# Patient Record
Sex: FEMALE | Race: WHITE
[De-identification: names, ages, dates, MRNs, and addresses within clinical notes are randomized per-mention and may not be internally consistent; named-entity substitution may affect disease eponyms.]

---

## 2017-01-05 ENCOUNTER — HOSPITAL ENCOUNTER (OUTPATIENT)
Dept: HOSPITAL 17 - CSDC | Age: 72
Discharge: HOME | End: 2017-01-05
Attending: OPTOMETRIST
Payer: COMMERCIAL

## 2017-01-05 VITALS — HEART RATE: 89 BPM

## 2017-01-05 VITALS
SYSTOLIC BLOOD PRESSURE: 131 MMHG | DIASTOLIC BLOOD PRESSURE: 65 MMHG | TEMPERATURE: 98 F | RESPIRATION RATE: 18 BRPM | HEART RATE: 100 BPM | OXYGEN SATURATION: 98 %

## 2017-01-05 VITALS — HEIGHT: 61 IN | WEIGHT: 139.99 LBS | BODY MASS INDEX: 26.43 KG/M2

## 2017-01-05 VITALS
RESPIRATION RATE: 16 BRPM | DIASTOLIC BLOOD PRESSURE: 77 MMHG | HEART RATE: 100 BPM | SYSTOLIC BLOOD PRESSURE: 139 MMHG | TEMPERATURE: 98.1 F | OXYGEN SATURATION: 99 %

## 2017-01-05 VITALS — HEART RATE: 100 BPM

## 2017-01-05 DIAGNOSIS — H25.812: Primary | ICD-10-CM

## 2017-01-05 PROCEDURE — V2632 POST CHMBR INTRAOCULAR LENS: HCPCS

## 2017-01-05 PROCEDURE — 00142 ANES PX ON EYE LENS SURGERY: CPT

## 2017-01-05 PROCEDURE — 66984 XCAPSL CTRC RMVL W/O ECP: CPT

## 2017-01-05 RX ADMIN — CYCLOPENTOLATE HYDROCHLORIDE SCH DROP: 10 SOLUTION/ DROPS OPHTHALMIC at 08:25

## 2017-01-05 RX ADMIN — PROPOFOL ONE MG: 10 INJECTION, EMULSION INTRAVENOUS at 08:57

## 2017-01-05 RX ADMIN — CYCLOPENTOLATE HYDROCHLORIDE SCH DROP: 10 SOLUTION/ DROPS OPHTHALMIC at 08:20

## 2017-01-05 RX ADMIN — TROPICAMIDE SCH DROP: 10 SOLUTION/ DROPS OPHTHALMIC at 08:10

## 2017-01-05 RX ADMIN — FLURBIPROFEN SODIUM SCH DROP: 0.3 SOLUTION/ DROPS OPHTHALMIC at 08:15

## 2017-01-05 RX ADMIN — CYCLOPENTOLATE HYDROCHLORIDE SCH DROP: 10 SOLUTION/ DROPS OPHTHALMIC at 08:15

## 2017-01-05 RX ADMIN — PHENYLEPHRINE HYDROCHLORIDE SCH DROP: 100 SOLUTION/ DROPS OPHTHALMIC at 08:15

## 2017-01-05 RX ADMIN — CYCLOPENTOLATE HYDROCHLORIDE SCH DROP: 10 SOLUTION/ DROPS OPHTHALMIC at 08:10

## 2017-01-05 RX ADMIN — FLURBIPROFEN SODIUM SCH DROP: 0.3 SOLUTION/ DROPS OPHTHALMIC at 08:10

## 2017-01-05 RX ADMIN — MIDAZOLAM HYDROCHLORIDE ONE MG: 1 INJECTION, SOLUTION INTRAMUSCULAR; INTRAVENOUS at 09:11

## 2017-01-05 RX ADMIN — FLURBIPROFEN SODIUM SCH DROP: 0.3 SOLUTION/ DROPS OPHTHALMIC at 08:20

## 2017-01-05 RX ADMIN — PROPOFOL ONE MG: 10 INJECTION, EMULSION INTRAVENOUS at 10:04

## 2017-01-05 RX ADMIN — TROPICAMIDE SCH DROP: 10 SOLUTION/ DROPS OPHTHALMIC at 08:25

## 2017-01-05 RX ADMIN — PHENYLEPHRINE HYDROCHLORIDE ONE MLS/HR: 10 INJECTION INTRAVENOUS at 10:00

## 2017-01-05 RX ADMIN — TROPICAMIDE SCH DROP: 10 SOLUTION/ DROPS OPHTHALMIC at 08:20

## 2017-01-05 RX ADMIN — PHENYLEPHRINE HYDROCHLORIDE SCH DROP: 100 SOLUTION/ DROPS OPHTHALMIC at 08:20

## 2017-01-05 RX ADMIN — PHENYLEPHRINE HYDROCHLORIDE SCH DROP: 100 SOLUTION/ DROPS OPHTHALMIC at 08:25

## 2017-01-05 RX ADMIN — MIDAZOLAM HYDROCHLORIDE ONE MG: 1 INJECTION, SOLUTION INTRAMUSCULAR; INTRAVENOUS at 10:00

## 2017-01-05 RX ADMIN — PHENYLEPHRINE HYDROCHLORIDE SCH DROP: 100 SOLUTION/ DROPS OPHTHALMIC at 08:10

## 2017-01-05 RX ADMIN — PHENYLEPHRINE HYDROCHLORIDE ONE MLS/HR: 10 INJECTION INTRAVENOUS at 08:49

## 2017-01-05 RX ADMIN — FLURBIPROFEN SODIUM SCH DROP: 0.3 SOLUTION/ DROPS OPHTHALMIC at 08:25

## 2017-01-05 RX ADMIN — TROPICAMIDE SCH DROP: 10 SOLUTION/ DROPS OPHTHALMIC at 08:15

## 2017-01-05 NOTE — MP
cc:

KRANTHI GONZALEZ M.D.

****

 

 

[facility] #[id number]

 

DATE OF SURGERY

1/5/2017

 

PREOPERATIVE DIAGNOSIS

Visually significant cataract left eye.

 

POSTOPERATIVE DIAGNOSIS

Visually significant cataract left eye.

 

OPERATION

Phacoemulsification with posterior chamber lens

implantation, left eye.

 

SURGEON

Kranthi Gonzalez MD

 

ANESTHESIA

Retrobulbar with MAC.

 

COMPLICATIONS

None

 

PROCEDURE

After informed consent was obtained, the patient was brought into the operative

suite and placed on appropriate monitors by the Anesthesia Service.  The

patient had received a prior retrobulbar injection of local anesthetic by the

Anesthesia Service in the holding area.  The patient's operative eye was then

prepped and draped in the usual sterile fashion.  A wire lid speculum was

placed.

 

A paracentesis incision was made in the peripheral cornea with a 1 mm conor

keratome.  The anterior chamber was filled with viscoelastic.  The anterior

chamber was then entered through a stepped, clear corneal incision using a

sharp 3 mm conor keratome.  A circular tear capsulorrhexis was then made with

a bent needle cystitome.  Following hydrodissection of the lens nucleus with

balanced saline, phacoemulsification of the nucleus was performed using a

modified chopping technique.  The remaining cortex was removed with

irrigation/aspiration.  The prior two

 

 

procedures were both performed using the handpieces of the [employer]

phaco unit.

 

The capsular bag was then filled with viscoelastic.  The intraocular lens was

then injected into the capsular bag and positioned.  The type of intraocular

lens and its power can be found elsewhere in this chart.  The remaining

viscoelastic was then removed from the anterior chamber with the IA handpiece.

 

 

The anterior chamber was reformed with balanced saline.  The wound was then

closed securely with stromal hydration.  It was found to be watertight to an

intraocular pressure of at least 30 mmHg by palpation.   A small amount of

balanced salt solution was then removed through the paracentesis site and the

intraocular pressure at the end of the case was approximately 20 by palpation.

 

 

All drapes were then removed.  TobraDex ointment was then placed in the eye,

which was closed beneath a semi-pressure patch dressing.

 

The patient tolerated this procedure well and left the operating room awake and

alert.  The patient is to follow-up in my office in the morning.

 

 

 

                              _________________________________

                              MD YRN Espinal/TANNER

D:  1/5/2017/10:05 AM

T:  1/5/2017/10:09 AM

Visit #:  [id number]

Job #:  [id number]

## 2017-02-16 ENCOUNTER — HOSPITAL ENCOUNTER (OUTPATIENT)
Dept: HOSPITAL 17 - CSDC | Age: 72
Discharge: HOME | End: 2017-02-16
Attending: OPTOMETRIST
Payer: MEDICARE

## 2017-02-16 VITALS
OXYGEN SATURATION: 99 % | RESPIRATION RATE: 16 BRPM | SYSTOLIC BLOOD PRESSURE: 90 MMHG | HEART RATE: 98 BPM | DIASTOLIC BLOOD PRESSURE: 62 MMHG

## 2017-02-16 VITALS
OXYGEN SATURATION: 95 % | TEMPERATURE: 98.1 F | RESPIRATION RATE: 16 BRPM | SYSTOLIC BLOOD PRESSURE: 132 MMHG | DIASTOLIC BLOOD PRESSURE: 78 MMHG | HEART RATE: 101 BPM

## 2017-02-16 VITALS — HEIGHT: 61.5 IN | BODY MASS INDEX: 27.12 KG/M2 | WEIGHT: 145.51 LBS

## 2017-02-16 VITALS — TEMPERATURE: 98.2 F

## 2017-02-16 VITALS — HEART RATE: 95 BPM

## 2017-02-16 DIAGNOSIS — H25.811: Primary | ICD-10-CM

## 2017-02-16 PROCEDURE — 00142 ANES PX ON EYE LENS SURGERY: CPT

## 2017-02-16 PROCEDURE — V2632 POST CHMBR INTRAOCULAR LENS: HCPCS

## 2017-02-16 PROCEDURE — 66984 XCAPSL CTRC RMVL W/O ECP: CPT

## 2017-02-16 RX ADMIN — TOBRAMYCIN AND DEXAMETHASONE ONE APPLIC: 3; 1 OINTMENT OPHTHALMIC at 09:06

## 2017-02-16 RX ADMIN — TOBRAMYCIN AND DEXAMETHASONE ONE APPLIC: 3; 1 OINTMENT OPHTHALMIC at 09:15

## 2017-02-17 NOTE — MP
cc:

KRANTHI GONZALEZ M.D.

****

 

 

[facility] #[id number]

 

DATE OF SURGERY

2/16/2017

 

PREOPERATIVE DIAGNOSIS

Visually significant cataract right eye.

 

POSTOPERATIVE DIAGNOSIS

Visually significant cataract right eye.

 

OPERATION

Phacoemulsification with posterior chamber lens

implantation, right eye.

 

SURGEON

Kranthi Gonzalez MD

 

ANESTHESIA

Retrobulbar with MAC.

 

COMPLICATIONS

None

 

PROCEDURE

After informed consent was obtained, the patient was brought into the operative

suite and placed on appropriate monitors by the Anesthesia Service.  The

patient had received a prior retrobulbar injection of local anesthetic by the

Anesthesia Service in the holding area.  The patient's operative eye was then

prepped and draped in the usual sterile fashion.  A wire lid speculum was

placed.

 

A paracentesis incision was made in the peripheral cornea with a 1 mm conor

keratome.  The anterior chamber was filled with viscoelastic.  The anterior

chamber was then entered through a stepped, clear corneal incision using a

sharp 3 mm conor keratome.  A circular tear capsulorrhexis was then made with

a bent needle cystitome.  Following hydrodissection of the lens nucleus with

balanced saline, phacoemulsification of the nucleus was performed using a

modified chopping technique.  The remaining cortex was removed with

irrigation/aspiration.  The prior two procedures were both performed using the

handpieces of the [employer] phaco unit.

 

The capsular bag was then filled with viscoelastic.  The intraocular lens was

then injected into the capsular bag and positioned.  The type of intraocular

lens and its power can be found elsewhere in this chart.  The remaining

viscoelastic was then removed from the anterior chamber with the IA handpiece.

 

 

The anterior chamber was reformed with balanced saline.  The wound was then

closed securely with stromal hydration.  It was found to be watertight to an

intraocular pressure of at least 30 mmHg by palpation.   A small amount of

balanced salt solution was then removed through the paracentesis site and the

intraocular pressure at the end of the case was approximately 20 by palpation.

 

 

All drapes were then removed.  TobraDex ointment was then placed in the eye,

which was closed beneath a semi-pressure patch dressing.

 

The patient tolerated this procedure well and left the operating room awake and

alert.  The patient is to follow-up in my office in the morning.

 

 

 

                              _________________________________

                              MD YRN Espinal/TANNER

D:  2/16/2017/10:15 AM

T:  2/17/2017/9:21 AM

Visit #:  [id number]

Job #:  [id number]

## 2018-01-09 ENCOUNTER — HOSPITAL ENCOUNTER (INPATIENT)
Dept: HOSPITAL 17 - NEPE | Age: 73
LOS: 10 days | Discharge: HOSPICE | DRG: 291 | End: 2018-01-19
Attending: FAMILY MEDICINE | Admitting: HOSPITALIST
Payer: MEDICARE

## 2018-01-09 VITALS
OXYGEN SATURATION: 95 % | TEMPERATURE: 97.4 F | HEART RATE: 88 BPM | RESPIRATION RATE: 20 BRPM | DIASTOLIC BLOOD PRESSURE: 58 MMHG | SYSTOLIC BLOOD PRESSURE: 91 MMHG

## 2018-01-09 VITALS — RESPIRATION RATE: 18 BRPM | SYSTOLIC BLOOD PRESSURE: 85 MMHG | DIASTOLIC BLOOD PRESSURE: 58 MMHG | HEART RATE: 100 BPM

## 2018-01-09 VITALS — RESPIRATION RATE: 16 BRPM | DIASTOLIC BLOOD PRESSURE: 66 MMHG | SYSTOLIC BLOOD PRESSURE: 93 MMHG | HEART RATE: 92 BPM

## 2018-01-09 VITALS — WEIGHT: 131.18 LBS | HEIGHT: 61 IN | BODY MASS INDEX: 24.77 KG/M2

## 2018-01-09 VITALS — OXYGEN SATURATION: 100 %

## 2018-01-09 DIAGNOSIS — R00.0: ICD-10-CM

## 2018-01-09 DIAGNOSIS — J96.01: ICD-10-CM

## 2018-01-09 DIAGNOSIS — D69.6: ICD-10-CM

## 2018-01-09 DIAGNOSIS — I42.8: ICD-10-CM

## 2018-01-09 DIAGNOSIS — K21.9: ICD-10-CM

## 2018-01-09 DIAGNOSIS — E86.0: ICD-10-CM

## 2018-01-09 DIAGNOSIS — G62.9: ICD-10-CM

## 2018-01-09 DIAGNOSIS — I50.43: Primary | ICD-10-CM

## 2018-01-09 DIAGNOSIS — K52.9: ICD-10-CM

## 2018-01-09 DIAGNOSIS — R57.0: ICD-10-CM

## 2018-01-09 DIAGNOSIS — I50.84: ICD-10-CM

## 2018-01-09 DIAGNOSIS — G93.40: ICD-10-CM

## 2018-01-09 DIAGNOSIS — I11.0: ICD-10-CM

## 2018-01-09 DIAGNOSIS — I27.81: ICD-10-CM

## 2018-01-09 DIAGNOSIS — J18.9: ICD-10-CM

## 2018-01-09 DIAGNOSIS — Z88.1: ICD-10-CM

## 2018-01-09 DIAGNOSIS — I27.29: ICD-10-CM

## 2018-01-09 DIAGNOSIS — R60.0: ICD-10-CM

## 2018-01-09 DIAGNOSIS — E78.5: ICD-10-CM

## 2018-01-09 DIAGNOSIS — I95.9: ICD-10-CM

## 2018-01-09 DIAGNOSIS — E87.2: ICD-10-CM

## 2018-01-09 DIAGNOSIS — N17.0: ICD-10-CM

## 2018-01-09 DIAGNOSIS — Z51.5: ICD-10-CM

## 2018-01-09 DIAGNOSIS — K76.1: ICD-10-CM

## 2018-01-09 DIAGNOSIS — R53.1: ICD-10-CM

## 2018-01-09 DIAGNOSIS — A41.9: ICD-10-CM

## 2018-01-09 DIAGNOSIS — Z88.5: ICD-10-CM

## 2018-01-09 DIAGNOSIS — E83.42: ICD-10-CM

## 2018-01-09 DIAGNOSIS — Z66: ICD-10-CM

## 2018-01-09 DIAGNOSIS — R10.13: ICD-10-CM

## 2018-01-09 DIAGNOSIS — Z88.0: ICD-10-CM

## 2018-01-09 DIAGNOSIS — D45: ICD-10-CM

## 2018-01-09 DIAGNOSIS — J90: ICD-10-CM

## 2018-01-09 DIAGNOSIS — I50.82: ICD-10-CM

## 2018-01-09 DIAGNOSIS — E16.2: ICD-10-CM

## 2018-01-09 LAB
ALBUMIN SERPL-MCNC: 2.8 GM/DL (ref 3.4–5)
ALP SERPL-CCNC: 74 U/L (ref 45–117)
ALT SERPL-CCNC: 17 U/L (ref 10–53)
AST SERPL-CCNC: 39 U/L (ref 15–37)
BASOPHILS # BLD AUTO: 0 TH/MM3 (ref 0–0.2)
BASOPHILS NFR BLD: 0.3 % (ref 0–2)
BILIRUB SERPL-MCNC: 0.8 MG/DL (ref 0.2–1)
BUN SERPL-MCNC: 30 MG/DL (ref 7–18)
CALCIUM SERPL-MCNC: 8.2 MG/DL (ref 8.5–10.1)
CHLORIDE SERPL-SCNC: 100 MEQ/L (ref 98–107)
CREAT SERPL-MCNC: 1.4 MG/DL (ref 0.5–1)
EOSINOPHIL # BLD: 0 TH/MM3 (ref 0–0.4)
EOSINOPHIL NFR BLD: 0 % (ref 0–4)
ERYTHROCYTE [DISTWIDTH] IN BLOOD BY AUTOMATED COUNT: 17.6 % (ref 11.6–17.2)
GFR SERPLBLD BASED ON 1.73 SQ M-ARVRAT: 37 ML/MIN (ref 89–?)
GLUCOSE SERPL-MCNC: 84 MG/DL (ref 74–106)
HCO3 BLD-SCNC: 17.5 MEQ/L (ref 21–32)
HCT VFR BLD CALC: 40.4 % (ref 35–46)
HGB BLD-MCNC: 13.4 GM/DL (ref 11.6–15.3)
INR PPP: 1.5 RATIO
LACTIC ACID SEPSIS PROTOCOL: 4 MMOL/L (ref 0.4–2)
LIPASE: 145 U/L (ref 73–393)
LYMPHOCYTES # BLD AUTO: 1.1 TH/MM3 (ref 1–4.8)
LYMPHOCYTES NFR BLD AUTO: 15.6 % (ref 9–44)
MAGNESIUM SERPL-MCNC: 1.2 MG/DL (ref 1.5–2.5)
MCH RBC QN AUTO: 30 PG (ref 27–34)
MCHC RBC AUTO-ENTMCNC: 33.2 % (ref 32–36)
MCV RBC AUTO: 90.3 FL (ref 80–100)
MONOCYTE #: 0.3 TH/MM3 (ref 0–0.9)
MONOCYTES NFR BLD: 3.9 % (ref 0–8)
NEUTROPHILS # BLD AUTO: 5.7 TH/MM3 (ref 1.8–7.7)
NEUTROPHILS NFR BLD AUTO: 80.2 % (ref 16–70)
PLATELET # BLD: 143 TH/MM3 (ref 150–450)
PMV BLD AUTO: 8.6 FL (ref 7–11)
PROT SERPL-MCNC: 6.7 GM/DL (ref 6.4–8.2)
PROTHROMBIN TIME: 15.6 SEC (ref 9.8–11.6)
RBC # BLD AUTO: 4.48 MIL/MM3 (ref 4–5.3)
SODIUM SERPL-SCNC: 134 MEQ/L (ref 136–145)
TROPONIN I SERPL-MCNC: 0.16 NG/ML (ref 0.02–0.05)
WBC # BLD AUTO: 7.1 TH/MM3 (ref 4–11)

## 2018-01-09 PROCEDURE — 84481 FREE ASSAY (FT-3): CPT

## 2018-01-09 PROCEDURE — 82805 BLOOD GASES W/O2 SATURATION: CPT

## 2018-01-09 PROCEDURE — 85610 PROTHROMBIN TIME: CPT

## 2018-01-09 PROCEDURE — 83690 ASSAY OF LIPASE: CPT

## 2018-01-09 PROCEDURE — 87116 MYCOBACTERIA CULTURE: CPT

## 2018-01-09 PROCEDURE — 83986 ASSAY PH BODY FLUID NOS: CPT

## 2018-01-09 PROCEDURE — 85007 BL SMEAR W/DIFF WBC COUNT: CPT

## 2018-01-09 PROCEDURE — 82945 GLUCOSE OTHER FLUID: CPT

## 2018-01-09 PROCEDURE — 87640 STAPH A DNA AMP PROBE: CPT

## 2018-01-09 PROCEDURE — 87040 BLOOD CULTURE FOR BACTERIA: CPT

## 2018-01-09 PROCEDURE — 87205 SMEAR GRAM STAIN: CPT

## 2018-01-09 PROCEDURE — 87015 SPECIMEN INFECT AGNT CONCNTJ: CPT

## 2018-01-09 PROCEDURE — 93306 TTE W/DOPPLER COMPLETE: CPT

## 2018-01-09 PROCEDURE — 82150 ASSAY OF AMYLASE: CPT

## 2018-01-09 PROCEDURE — C1769 GUIDE WIRE: HCPCS

## 2018-01-09 PROCEDURE — 36600 WITHDRAWAL OF ARTERIAL BLOOD: CPT

## 2018-01-09 PROCEDURE — 84157 ASSAY OF PROTEIN OTHER: CPT

## 2018-01-09 PROCEDURE — 36430 TRANSFUSION BLD/BLD COMPNT: CPT

## 2018-01-09 PROCEDURE — 82550 ASSAY OF CK (CPK): CPT

## 2018-01-09 PROCEDURE — 71045 X-RAY EXAM CHEST 1 VIEW: CPT

## 2018-01-09 PROCEDURE — 93005 ELECTROCARDIOGRAM TRACING: CPT

## 2018-01-09 PROCEDURE — 83735 ASSAY OF MAGNESIUM: CPT

## 2018-01-09 PROCEDURE — 96374 THER/PROPH/DIAG INJ IV PUSH: CPT

## 2018-01-09 PROCEDURE — 83605 ASSAY OF LACTIC ACID: CPT

## 2018-01-09 PROCEDURE — 81001 URINALYSIS AUTO W/SCOPE: CPT

## 2018-01-09 PROCEDURE — 80053 COMPREHEN METABOLIC PANEL: CPT

## 2018-01-09 PROCEDURE — 84443 ASSAY THYROID STIM HORMONE: CPT

## 2018-01-09 PROCEDURE — 84100 ASSAY OF PHOSPHORUS: CPT

## 2018-01-09 PROCEDURE — 87102 FUNGUS ISOLATION CULTURE: CPT

## 2018-01-09 PROCEDURE — 87493 C DIFF AMPLIFIED PROBE: CPT

## 2018-01-09 PROCEDURE — 86038 ANTINUCLEAR ANTIBODIES: CPT

## 2018-01-09 PROCEDURE — 74177 CT ABD & PELVIS W/CONTRAST: CPT

## 2018-01-09 PROCEDURE — 85730 THROMBOPLASTIN TIME PARTIAL: CPT

## 2018-01-09 PROCEDURE — 94003 VENT MGMT INPAT SUBQ DAY: CPT

## 2018-01-09 PROCEDURE — C9113 INJ PANTOPRAZOLE SODIUM, VIA: HCPCS

## 2018-01-09 PROCEDURE — 84484 ASSAY OF TROPONIN QUANT: CPT

## 2018-01-09 PROCEDURE — P9035 PLATELET PHERES LEUKOREDUCED: HCPCS

## 2018-01-09 PROCEDURE — 83880 ASSAY OF NATRIURETIC PEPTIDE: CPT

## 2018-01-09 PROCEDURE — 94002 VENT MGMT INPAT INIT DAY: CPT

## 2018-01-09 PROCEDURE — 85025 COMPLETE CBC W/AUTO DIFF WBC: CPT

## 2018-01-09 PROCEDURE — 88112 CYTOPATH CELL ENHANCE TECH: CPT

## 2018-01-09 PROCEDURE — 87070 CULTURE OTHR SPECIMN AEROBIC: CPT

## 2018-01-09 PROCEDURE — P9047 ALBUMIN (HUMAN), 25%, 50ML: HCPCS

## 2018-01-09 PROCEDURE — 86140 C-REACTIVE PROTEIN: CPT

## 2018-01-09 PROCEDURE — 87641 MR-STAPH DNA AMP PROBE: CPT

## 2018-01-09 PROCEDURE — 84132 ASSAY OF SERUM POTASSIUM: CPT

## 2018-01-09 PROCEDURE — 86022 PLATELET ANTIBODIES: CPT

## 2018-01-09 PROCEDURE — 83615 LACTATE (LD) (LDH) ENZYME: CPT

## 2018-01-09 PROCEDURE — 82948 REAGENT STRIP/BLOOD GLUCOSE: CPT

## 2018-01-09 PROCEDURE — 89051 BODY FLUID CELL COUNT: CPT

## 2018-01-09 PROCEDURE — 84439 ASSAY OF FREE THYROXINE: CPT

## 2018-01-09 PROCEDURE — 85027 COMPLETE CBC AUTOMATED: CPT

## 2018-01-09 PROCEDURE — 87206 SMEAR FLUORESCENT/ACID STAI: CPT

## 2018-01-09 PROCEDURE — 80162 ASSAY OF DIGOXIN TOTAL: CPT

## 2018-01-09 PROCEDURE — 80048 BASIC METABOLIC PNL TOTAL CA: CPT

## 2018-01-09 NOTE — RADRPT
EXAM DATE/TIME:  01/09/2018 19:39 

 

[facility] COMPARISON:     

No previous studies available for comparison.

 

                     

INDICATIONS :     

Short of breath. 

                     

 

MEDICAL HISTORY :     

Congestive heart failure.          

 

SURGICAL HISTORY :     

None.   

 

ENCOUNTER:     

Initial                                        

 

ACUITY:     

1 day      

 

PAIN SCORE:     

1/10

 

LOCATION:     

Bilateral chest 

 

FINDINGS:     

There are moderate right and small left pleural effusions with basilar consolidation noted. No pneumo
thorax. Mild cardiomegaly noted.

 

CONCLUSION:     

Right greater than left pleural effusions and basilar consolidation.

 

 

 

 Demian Alegria MD on January 09, 2018 at 19:44           

Board Certified Radiologist.

 This report was verified electronically.

## 2018-01-09 NOTE — PD
HPI


Chief Complaint:  GI Complaint


Time Seen by Provider:  18:49


Travel History


International Travel<30 days:  No


Contact w/Intl Traveler<30days:  No


Traveled to known affect area:  No





History of Present Illness


HPI


72-year-old female presents to the emergency department via EMS for evaluation 

of generalized weakness, nausea.  Patient states she was diagnosed with 

congestive heart failure in October.  She states she was admitted to [facility] on December 23 and released on January 1.  She states that 

today, she has been so weak that she has been unable to get herself out of the 

bed to her walker.  She reports history of diarrhea for several months, but her 

primary care physician gave her Lomotil which helps.  She states that she had 

one episode of diarrhea today.  She reports nausea.  She also reports decreased 

appetite.  She states she has chronic shortness of breath.  She denies any 

chest pain.  Upon palpation, she is epigastric tenderness to palpation of her 

abdomen.  Patient has 3+ pitting edema to her bilateral lower extremities.  She 

states it has been like this since she was discharged on June refer [facility].  No exacerbating or alleviating factors.  Moderate severity.





PFSH


Past Medical History


Arthritis:  Yes


Asthma:  No


Autoimmune Disease:  No


Blood Disorders:  No


Cancer:  No


Cardiovascular Problems:  No


Chemotherapy:  No


COPD:  No


Diabetes:  No


Endocrine:  No


GERD:  Yes


Genitourinary:  No


Hepatitis:  No


Hiatal Hernia:  Yes


Immune Disorder:  No


Musculoskeletal:  Yes (ARTHRITIS LOWER BACK, HANDS, FEET)


Neurologic:  No


Psychiatric:  No


Reproductive:  No


Respiratory:  Yes (ENVIRONMENTAL ALLERGIES, BRONCHITIS)


Radiation Therapy:  No


Thyroid Disease:  No


Ulcer:  No





Past Surgical History


Abdominal Surgery:  Yes (GARCIA.,INCISIONAL HERNIA REPAIR)


AICD:  No


Cardiac Surgery:  No


Ear Surgery:  No


Endocrine Surgery:  Yes


Eye Surgery:  Yes (LEFT CATARACT SURGERY)


Genitourinary Surgery:  Yes (URETHROTOMY)


Gynecologic Surgery:  No


Joint Replacement:  Yes (BILAT. HIP REPL.)


Oral Surgery:  No


Pacemaker:  No


Thoracic Surgery:  No





Social History


Alcohol Use:  Yes (1-2 WINE 2 OR 3 X / WEEK)


Tobacco Use:  No


Substance Use:  No





Allergies-Medications


(Allergen,Severity, Reaction):  


Coded Allergies:  


     codeine (Unverified  Allergy, Severe, ITCH, 8/15/17)


     penicillin G (Unverified  Allergy, Severe, RASH, 8/15/17)


     vancomycin (Unverified  Allergy, Severe, HIVES, EDEMA/SWELLING, 8/15/17)


Uncoded Allergies:  


     ANGIOTENSIN RECEPTOR BLOCKER (Adverse Reaction, Severe, Cough, 1/5/17)


Reported Meds & Prescriptions





Reported Meds & Active Scripts


Active


Reported


Diphenoxylate-Atropine 2.5-0.025 Mg Tab 1 Tab PO Q6H PRN


Folic Acid 0.4 Mg Tab 1 Mg PO DAILY


Vitamin D3 (Cholecalciferol) 1,000 Unit Cap 1,000 Units PO DAILY


Furosemide 40 Mg Tab 40 Mg PO DAILY


Pantoprazole (Pantoprazole Sodium) 40 Mg Tab 40 Mg PO DAILY


K-Tab (Potassium Chloride) 10 Meq Tab 10 Meq PO DAILY








Review of Systems


Except as stated in HPI:  all other systems reviewed are Neg





Physical Exam


Narrative


GENERAL: Well-nourished, well-developed female patient, afebrile.


SKIN: Focused skin assessment warm/dry.


HEAD: Normocephalic.  Atraumatic.


EYES: No scleral icterus. No injection or drainage. 


NECK: Supple, trachea midline. No JVD or lymphadenopathy.


CARDIOVASCULAR: Regular rate and rhythm without murmurs, gallops, or rubs. 


RESPIRATORY: Breath sounds equal bilaterally. No accessory muscle use.  Lungs 

sounds clear to auscultation, diminished right base.


GASTROINTESTINAL: Abdomen soft and nondistended.  She has epigastric tenderness 

to palpation.


MUSCULOSKELETAL: No cyanosis.  Patient has pitting 3+ edema to the bilateral 

lower extremities.


BACK: Nontender without obvious deformity. No CVA tenderness.





Data


Data


Last Documented VS





Vital Signs








  Date Time  Temp Pulse Resp B/P (MAP) Pulse Ox O2 Delivery O2 Flow Rate FiO2


 


1/9/18 21:00  92 16 93/66 (75)    


 


1/9/18 18:39 97.4    95   








Orders





 Orders


Electrocardiogram (1/9/18 19:08)


Complete Blood Count With Diff (1/9/18 19:08)


Comprehensive Metabolic Panel (1/9/18 19:08)


Magnesium (Mg) (1/9/18 19:08)


B-Type Natriuretic Peptide (1/9/18 19:08)


Ckmb (Isoenzyme) Profile (1/9/18 19:08)


Troponin I (1/9/18 19:08)


Act Partial Throm Time (Ptt) (1/9/18 19:08)


Prothrombin Time / Inr (Pt) (1/9/18 19:08)


Urinalysis - C+S If Indicated (1/9/18 19:08)


Chest, Single Ap (1/9/18 19:08)


Ecg Monitoring (1/9/18 19:08)


Iv Access Insert/Monitor (1/9/18 19:08)


Oximetry (1/9/18 19:08)


Ondansetron Inj (Zofran Inj) (1/9/18 19:15)


Sodium Chloride 0.9% Flush (Ns Flush) (1/9/18 19:15)


Lipase (1/9/18 19:08)


Ct Abd/Pel W Iv Contrast(Rout) (1/9/18 )


Lactic Acid Sepsis Protocol (1/9/18 19:57)


Blood Culture (1/9/18 19:57)


Admit Order (Ed Use Only) (1/9/18 21:43)


Aspirin Chew (Aspirin Chew) (1/9/18 21:45)





Labs





Laboratory Tests








Test


  1/9/18


19:30 1/9/18


20:26


 


White Blood Count 7.1 TH/MM3  


 


Red Blood Count 4.48 MIL/MM3  


 


Hemoglobin 13.4 GM/DL  


 


Hematocrit 40.4 %  


 


Mean Corpuscular Volume 90.3 FL  


 


Mean Corpuscular Hemoglobin 30.0 PG  


 


Mean Corpuscular Hemoglobin


Concent 33.2 % 


  


 


 


Red Cell Distribution Width 17.6 %  


 


Platelet Count 143 TH/MM3  


 


Mean Platelet Volume 8.6 FL  


 


Neutrophils (%) (Auto) 80.2 %  


 


Lymphocytes (%) (Auto) 15.6 %  


 


Monocytes (%) (Auto) 3.9 %  


 


Eosinophils (%) (Auto) 0.0 %  


 


Basophils (%) (Auto) 0.3 %  


 


Neutrophils # (Auto) 5.7 TH/MM3  


 


Lymphocytes # (Auto) 1.1 TH/MM3  


 


Monocytes # (Auto) 0.3 TH/MM3  


 


Eosinophils # (Auto) 0.0 TH/MM3  


 


Basophils # (Auto) 0.0 TH/MM3  


 


CBC Comment DIFF FINAL  


 


Differential Comment   


 


Prothrombin Time 15.6 SEC  


 


Prothromb Time International


Ratio 1.5 RATIO 


  


 


 


Activated Partial


Thromboplast Time 28.4 SEC 


  


 


 


Blood Urea Nitrogen 30 MG/DL  


 


Creatinine 1.40 MG/DL  


 


Random Glucose 84 MG/DL  


 


Total Protein 6.7 GM/DL  


 


Albumin 2.8 GM/DL  


 


Calcium Level 8.2 MG/DL  


 


Magnesium Level 1.2 MG/DL  


 


Alkaline Phosphatase 74 U/L  


 


Aspartate Amino Transf


(AST/SGOT) 39 U/L 


  


 


 


Alanine Aminotransferase


(ALT/SGPT) 17 U/L 


  


 


 


Total Bilirubin 0.8 MG/DL  


 


Sodium Level 134 MEQ/L  


 


Potassium Level 4.4 MEQ/L  


 


Chloride Level 100 MEQ/L  


 


Carbon Dioxide Level 17.5 MEQ/L  


 


Anion Gap 17 MEQ/L  


 


Estimat Glomerular Filtration


Rate 37 ML/MIN 


  


 


 


Total Creatine Kinase 41 U/L  


 


Troponin I 0.16 NG/ML  


 


B-Type Natriuretic Peptide 3089 PG/ML  


 


Lipase 145 U/L  


 


Lactic Acid Level  4.0 mmol/L 











[facility]


Medical Decision Making


Medical Screen Exam Complete:  Yes


Emergency Medical Condition:  Yes


Medical Record Reviewed:  Yes


Differential Diagnosis


Electrolyte abnormality versus dehydration versus UTI versus pneumonia versus 

diverticulitis versus CHF exacerbation


Narrative Course


72-year-old female presents to the emergency department via EMS for evaluation 

of generalized weakness, unable to get herself out of bed with her walker 

today.  EKG, CBC, CMP, magnesium, BNP, CK, troponin, lipase, PTT, PTT/INR, UA 

are ordered and pending.  Chest x-ray is ordered and pending.  CT abdomen/

pelvis with IV contrast is ordered and pending.  Patient is given Zofran 4 mg 

IV.





EKG shows sinus tachycardia, heart rate 106.  CBC shows no acute abnormality.  

CMP shows low bicarbonate 117.5, anion gap 17, BUN 30, cardiac 1.40.  Lipase is 

145.  CK is 41.  Troponin is 0.16.  Magnesium is 1.2.  BNP is 3089.  Lactic 

acid is 4.0.  PT is 15.7, INR 1.5, PTT 28.4.  UA is pending.  Chest x-ray shows 

right greater than left pleural effusions and basilar consolidation.  CT abdomen

/pelvis is pending.





Dr. Mccarthy report healthcare was in the emergency department.  He has seen 

and evaluated the patient.  He was able to review some of the previous records 

from [facility].  Apparently, her been occurring are elevated from her 

baseline.  Her lactic acid was 3.0 there.  He would like the patient to get 

albumin followed by [facility] and admitted.  He will follow up on CT scan.  He 

states she had a CT the abdomen/pelvis done at [facility] which showed 

nothing acute.





Diagnosis





 Primary Impression:  


 CHF exacerbation


 Qualified Codes:  I50.9 - Heart failure, unspecified


 Additional Impressions:  


 Lactic acidosis


 Elevated troponin


 Hypotension


 Qualified Codes:  I95.9 - Hypotension, unspecified





Admitting Information


Admitting Physician Requests:  Admit











Regla Lynn Jan 9, 2018 19:15

## 2018-01-09 NOTE — HHI.HP
HPI


Service


[facility]


Primary Care Physician


Fito Martin M.D.


Admission Diagnosis





CHF exacerbation, lactic acidosis, hypotension


Chief Complaint:  


LE edema, increased weakness, low BP


Travel History


International Travel<30 Days:  No


Contact w/Intl Traveler <30 Da:  No


Traveled to Known Affected Are:  No


History of Present Illness


72-year-old female with multiple medical problems presents to the emergency 

department via EMS for evaluation of generalized weakness, nausea.  Patient 

states she was diagnosed with congestive heart failure in October.  She states 

she was admitted to [facility] on  and released on 

2018.  She states that today, she has been so weak that she has been 

unable to get herself out of the bed to her walker.  She reports history of 

diarrhea for several months, but her primary care physician gave her Lomotil 

which helps.  She states that she had one episode of diarrhea today.  She 

reports nausea, but no vomiting.  She also reports decreased appetite.  She 

states she has chronic shortness of breath.  She denies any chest pain.  

Patient has 3+ pitting edema to her bilateral lower extremities which she and 

her son report started when she was admitted to the hospital recently.  I have 

reviewed her outpatient records from [facility] which revealed that she 

was admitted in late 2017 for sepsis syndrome and apparent congestive 

heart failure.  Her ejection fracture was around 20-25% at that time.  He did 

with IV antibiotics and pressors.  Per outpatient notes the cultures never grew 

anything.  Patient denies fever or chills at home.  Specifically denies any 

productive cough.  She has just finished some antibiotics from the recent 

hospitalization.





Review of Systems


Constitutional:  COMPLAINS OF: Fatigue, Weight loss


Eyes:  DENIES: Blurred vision, Diplopia, Eye inflammation, Eye pain, Vision loss

, Photosensitivity, Double Vision


Respiratory:  COMPLAINS OF: Shortness of breath, DENIES: Apneas, Cough, Snoring

, Wheezing, Hemoptysis, Sputum production


Cardiovascular:  COMPLAINS OF: Dyspnea on Exertion, Lower Extremity Edema, 

Orthopnea, DENIES: Chest pain, Palpitations, Syncope, PND, Claudication


Gastrointestinal:  COMPLAINS OF: Abdominal pain, Diarrhea, GERD, Nausea, DENIES

: Black stools, Bloody stools, BRB per rectum, Constipation, Reflux, Vomiting, 

Difficulty Swallowing, Anorexia, See HPI


Musculoskeletal:  COMPLAINS OF: Joint pain, Back pain


Integumentary:  DENIES: Abnormal pigmentation, Pruritus, Rash, Nail changes, 

Breast masses, Breast skin changes, Nipple discharge


Hematologic/lymphatic:  COMPLAINS OF: Bruising


Neurologic:  COMPLAINS OF: Abnormal gait


Psychiatric:  COMPLAINS OF: Anxiety





Past Family Social History


Past Medical History


Cardiomyopathy with an EF around 20% based on echo done in 2017


Chronic bronchitis


Colitis with chronic diarrhea of unknown etiology


Fatty liver


GERD


Hypertension


Hyperlipidemia


Hyperthyroidism


Hypomagnesemia


Peripheral neuropathy


Polycythemia vera Mallard history of sepsis with prior infection with MRSA and 

strep a


B12 and vitamin D deficiency


Past Surgical History


Cataract surgery


Cholecystectomy


Total hip replacement on the right


Ureterostomy


Ventral hernia repair in 


Reported Medications


Zyrtec Allergy (Cetirizine HCl) 10 Mg Cap 2.5 Mg PO DAILY


Tramadol (Tramadol HCl) 50 Mg Tab 50 Mg PO Q8H PRN


Vitamin D-3 (Cholecalciferol) 1,000 Unit Tab 1,000 Units PO DAILY


Chlorthalidone 25 Mg Tab 25 Mg PO DAILY


Nifedipine ER 24 HR (Nifedipine) 30 Mg Tab 30 Mg PO DAILY


Pantoprazole (Pantoprazole Sodium) 40 Mg Tab 40 Mg PO DAILY


K-Tab (Potassium Chloride) 10 Meq Tab 10 Meq PO DAILY


Mobic (Meloxicam) 15 Mg Tab 15 Mg PO HERMANN


Allergies:  


Coded Allergies:  


     codeine (Unverified  Allergy, Severe, ITCH, 8/15/17)


     penicillin G (Unverified  Allergy, Severe, RASH, 8/15/17)


     vancomycin (Unverified  Allergy, Severe, HIVES, EDEMA/SWELLING, 8/15/17)


Uncoded Allergies:  


     ANGIOTENSIN RECEPTOR BLOCKER (Adverse Reaction, Severe, Cough, 17)


Family History


Noncontributory


Social History


No tobacco ever


Did previously drink alcohol 2-3 times per week but hasn't drank anything the 

last week or so


Lives alone but her son lives close by just down the street


Originally from [place], moved here in 


Previously a  and principal





Physical Exam


Vital Signs





Vital Signs








  Date Time  Temp Pulse Resp B/P (MAP) Pulse Ox O2 Delivery O2 Flow Rate FiO2


 


18 21:00  92 16 93/66 (75)    


 


18 20:17   18     


 


18 20:00  100 18 85/58 (67)    


 


18 18:39 97.4 88 20 91/58 (69) 95   








Physical Exam


GENERAL: This is a well-nourished, elderly, well-developed patient, in no 

apparent distress.


SKIN: Distal extremities somewhat cool to touch.


HEAD: Atraumatic. Normocephalic. No temporal or scalp tenderness.


EYES: Pupils equal round and reactive. Extraocular motions intact. No scleral 

icterus. No injection or drainage. 


ENT: Nose without bleeding, purulent drainage or septal hematoma.  Airway 

patent.


NECK: Trachea midline. No JVD or lymphadenopathy. Supple, nontender, no 

meningeal signs.


CARDIOVASCULAR: Regular rate and rhythm without murmurs, gallops, or rubs.  

Rate around 100.


RESPIRATORY: Some decreased breath sounds at the bases but otherwise relatively 

clear.  Breath sounds equal bilaterally. No wheezes.  Did not appreciate fine 

crackles.


GASTROINTESTINAL: Abdomen soft, nondistended, mildly tender to palpation in 

epigastrium. No hepato-splenomegaly, or palpable masses. No guarding.


MUSCULOSKELETAL: 3+ pitting edema bilateral lower extremities up to the knee.  

No calf tenderness.  Distal feet somewhat cool to touch.


NEUROLOGICAL: Awake and alert. Cranial nerves II through XII intact.  Motor and 

sensory grossly within normal limits. Five out of 5 muscle strength in all 

muscle groups.  Normal speech.


Laboratory





Laboratory Tests








Test


  18


19:30 18


20:26


 


White Blood Count 7.1  


 


Red Blood Count 4.48  


 


Hemoglobin 13.4  


 


Hematocrit 40.4  


 


Mean Corpuscular Volume 90.3  


 


Mean Corpuscular Hemoglobin 30.0  


 


Mean Corpuscular Hemoglobin


Concent 33.2 


  


 


 


Red Cell Distribution Width 17.6  


 


Platelet Count 143  


 


Mean Platelet Volume 8.6  


 


Neutrophils (%) (Auto) 80.2  


 


Lymphocytes (%) (Auto) 15.6  


 


Monocytes (%) (Auto) 3.9  


 


Eosinophils (%) (Auto) 0.0  


 


Basophils (%) (Auto) 0.3  


 


Neutrophils # (Auto) 5.7  


 


Lymphocytes # (Auto) 1.1  


 


Monocytes # (Auto) 0.3  


 


Eosinophils # (Auto) 0.0  


 


Basophils # (Auto) 0.0  


 


CBC Comment DIFF FINAL  


 


Differential Comment   


 


Prothrombin Time 15.6  


 


Prothromb Time International


Ratio 1.5 


  


 


 


Activated Partial


Thromboplast Time 28.4 


  


 


 


Blood Urea Nitrogen 30  


 


Creatinine 1.40  


 


Random Glucose 84  


 


Total Protein 6.7  


 


Albumin 2.8  


 


Calcium Level 8.2  


 


Magnesium Level 1.2  


 


Alkaline Phosphatase 74  


 


Aspartate Amino Transf


(AST/SGOT) 39 


  


 


 


Alanine Aminotransferase


(ALT/SGPT) 17 


  


 


 


Total Bilirubin 0.8  


 


Sodium Level 134  


 


Potassium Level 4.4  


 


Chloride Level 100  


 


Carbon Dioxide Level 17.5  


 


Anion Gap 17  


 


Estimat Glomerular Filtration


Rate 37 


  


 


 


Total Creatine Kinase 41  


 


Troponin I 0.16  


 


B-Type Natriuretic Peptide 3089  


 


Lipase 145  


 


Lactic Acid Level  4.0 














 Date/Time


Source Procedure


Growth Status


 


 


 18 20:26


Blood Peripheral Aerobic Blood Culture


Pending Received


 


 18 20:26


Blood Peripheral Anaerobic Blood Culture


Pending Received








Result Diagram:  


18





Imaging





Last 72 hours Impressions








Chest X-Ray 18 Signed





Impressions: 





 Service Date/Time:  2018 19:39 - CONCLUSION:  Right 

greater 





 than left pleural effusions and basilar consolidation.     John E. Agles, MD Caprini VTE Risk Assessment


Caprini VTE Risk Assessment:  Mod/High Risk (score >= 2)


Caprini Risk Assessment Model











 Point Value = 1          Point Value = 2  Point Value = 3  Point Value = 5


 


Age 41-60


Minor surgery


BMI > 25 kg/m2


Swollen legs


Varicose veins


Pregnancy or postpartum


History of unexplained or recurrent


   spontaneous 


Oral contraceptives or hormone


   replacement


Sepsis (< 1 month)


Serious lung disease, including


   pneumonia (< 1 month)


Abnormal pulmonary function


Acute myocardial infarction


Congestive heart failure (< 1 month)


History of inflammatory bowel disease


Medical patient at bed rest Age 61-74


Arthroscopic surgery


Major open surgery (> 45 min)


Laparoscopic surgery (> 45 min)


Malignancy


Confined to bed (> 72 hours)


Immobilizing plaster cast


Central venous access Age >= 75


History of VTE


Family history of VTE


Factor V Leiden


Prothrombin 12234R


Lupus anticoagulant


Anticardiolipin antibodies


Elevated serum homocysteine


Heparin-induced thrombocytopenia


Other congenital or acquired


   thrombophilia Stroke (< 1 month)


Elective arthroplasty


Hip, pelvis, or leg fracture


Acute spinal cord injury (< 1 month)








Prophylaxis Regimen











   Total Risk


Factor Score Risk Level Prophylaxis Regimen


 


0-1      Low Early ambulation


 


2 Moderate Order ONE of the following:


*Sequential Compression Device (SCD)


*Heparin 5000 units SQ BID


 


3-4 Higher Order ONE of the following medications:


*Heparin 5000 units SQ TID


*Enoxaparin/Lovenox 40 mg SQ daily (WT < 150 kg, CrCl > 30 mL/min)


*Enoxaparin/Lovenox 30 mg SQ daily (WT < 150 kg, CrCl > 10-29 mL/min)


*Enoxaparin/Lovenox 30 mg SQ BID (WT < 150 kg, CrCl > 30 mL/min)


AND/OR


*Sequential Compression Device (SCD)


 


5 or more Highest Order ONE of the following medications:


*Heparin 5000 units SQ TID (Preferred with Epidurals)


*Enoxaparin/Lovenox 40 mg SQ daily (WT < 150 kg, CrCl > 30 mL/min)


*Enoxaparin/Lovenox 30 mg SQ daily (WT < 150 kg, CrCl > 10-29 mL/min)


*Enoxaparin/Lovenox 30 mg SQ BID (WT < 150 kg, CrCl > 30 mL/min)


AND


*Sequential Compression Device (SCD)











Assessment and Plan


Problem List:  


(1) CHF exacerbation


ICD Codes:  I50.9 - Heart failure, unspecified


Status:  Acute


Plan:  EF around 20% on recent echo.


Gently diurese as tolerated.


Have cardiology see the patient.





(2) Elevated troponin


ICD Codes:  R74.8 - Abnormal levels of other serum enzymes


Status:  Acute


Plan:  Likely associated with her renal insufficiency and cardiomyopathy.  She 

denies any chest pain.


Continue rule out protocol.





(3) Hypotension


ICD Codes:  I95.9 - Hypotension, unspecified


Status:  Acute


Plan:  Chronic problem.  Patient cannot tolerate any antihypertensives.


Current blood pressure is similar to her baseline.





(4) Lactic acidosis


ICD Codes:  E87.2 - Acidosis


Status:  Acute


Plan:  Questionable etiology.  Similar presentation recently.  Possibly 

associated with underlying cardiomyopathy and renal insufficiency.


I do not see an obvious source of infection and her recent cultures were all 

negative.


Given her multiple medical problems awaiting give a dose of Rocephin and Flagyl 

as she has been somewhat nauseated and has reflux with noted fluid in the right 

lung.  I do not strongly suspect pneumonia given her additional presentation 

however.





Code Status


Full


Discussed Condition With


ER provider, Patient and her 2 sons, Angel and Kenyon.





Physician Certification


2 Midnight Certification Type:  Admission for Inpatient Services


Order for Inpatient Services


The services are ordered in accordance with Medicare regulations or non-

Medicare payer requirements, as applicable.  In the case of services not 

specified as inpatient-only, they are appropriately provided as inpatient 

services in accordance with the 2-midnight benchmark.


Estimated LOS (days):  3


 days is the estimated time the patient will need to remain in the hospital, 

assuming treatment plan goals are met and no additional complications.


Post-Hospital Plan:  Not yet determined





Problem Qualifiers





(1) CHF exacerbation:  


Qualified Codes:  I50.9 - Heart failure, unspecified


(2) Hypotension:  


Qualified Codes:  I95.9 - Hypotension, unspecified








Francisco Mccarthy MD PhD 2018 22:13

## 2018-01-09 NOTE — RADRPT
EXAM DATE/TIME:  01/09/2018 21:30 

 

[facility] COMPARISON:     

CHEST SINGLE AP, January 09, 2018, 19:39.

 

 

INDICATIONS :     

Abdominal pain, general weakness and diarrhea. 

                      

 

IV CONTRAST:     

50 cc Visipaque (iodixanol) IV 

 

 

ORAL CONTRAST:      

No oral contrast ingested.

                      

 

RADIATION DOSE:     

6.64 CTDIvol (mGy) 

 

 

MEDICAL HISTORY :     

Hypertension. Hernia, hiatal. Gastroesophageal reflux disease.IBS.

 

SURGICAL HISTORY :      

Cholecystectomy. Bilateral hip replacements.

 

ENCOUNTER:      

Initial

 

ACUITY:      

1 month

 

PAIN SCALE:      

5/10

 

LOCATION:         

All quadrants. 

 

TECHNIQUE:     

Volumetric scanning of the abdomen and pelvis was performed.  Using automated exposure control and ad
justment of the mA and/or kV according to patient size, radiation dose was kept as low as reasonably 
achievable to obtain optimal diagnostic quality images.  DICOM format image data is available electro
nically for review and comparison.  

 

FINDINGS:     

 

LOWER LUNGS:     

Moderate right and small left pleural effusions are seen with compressive atelectasis. There is a com
bination of atelectasis and pneumonia of the right lower lobe. There is a moderate pericardial effusi
on.

 

LIVER:     

9 mm hypodensity of the right hepatic lobe that is very likely benign. No other focal hepatic lesions
 are demonstrated. Patient has had previous cholecystectomy.

 

SPLEEN:     

Normal size without lesion.

 

PANCREAS:     

Within normal limits.

 

KIDNEYS:     

There is an approximately 23 mm cyst of the mid zone of the right kidney. Along its inferior margin i
s questionable mildly enhancing soft tissue. No stones or obstruction seen of either kidney.

 

ADRENAL GLANDS:     

Within normal limits.

 

VASCULAR:     

There is no aortic aneurysm.

 

BOWEL/MESENTERY:     

No obstruction or acute inflammatory changes are demonstrated. There is nonspecific fluid in the colo
n. No free fluid. No lymphadenopathy.

 

ABDOMINAL WALL:     

Within normal limits.

 

RETROPERITONEUM:     

There is no lymphadenopathy. 

 

BLADDER:     

No wall thickening or mass. 

 

REPRODUCTIVE:     

Within normal limits.

 

INGUINAL:     

There is no lymphadenopathy or hernia. 

 

MUSCULOSKELETAL:     

No acute bony abnormality demonstrated. Patient has had previous bilateral hip arthroplasties and the
re is associated metallic streak artifact

 

CONCLUSION:     

1. No obstruction or acute inflammatory changes are seen in the abdomen or pelvis.

2. Apparent complex cystic and solid mass of the mid zone of the right kidney. A followup MRI of the 
abdomen is recommended with and without contrast in approximately 3 months. This may be helpful in fu
rther characterizing a small, probably benign but nonspecific hypodensity of the liver.

3. Nonspecific right greater than left pleural effusions and basilar atelectasis. There is an area of
 nonenhancing right lower lobe pulmonary parenchyma compatible with pneumonia. Infection and post obs
tructive process would be in the differential. After treatment for presumed pneumonia, a followup CT 
of the chest, preferably with intravenous contrast, is recommended within the next couple of months, 
sooner if felt clinically indicated. 

4. Also a nonspecific moderate size pericardial effusion.

 

 

 

 Demian Alegria MD on January 09, 2018 at 22:01           

Board Certified Radiologist.

 This report was verified electronically.

## 2018-01-10 VITALS — HEART RATE: 96 BPM

## 2018-01-10 VITALS — HEART RATE: 98 BPM

## 2018-01-10 VITALS
HEART RATE: 99 BPM | OXYGEN SATURATION: 99 % | RESPIRATION RATE: 16 BRPM | DIASTOLIC BLOOD PRESSURE: 66 MMHG | TEMPERATURE: 98 F | SYSTOLIC BLOOD PRESSURE: 91 MMHG

## 2018-01-10 VITALS — HEART RATE: 89 BPM

## 2018-01-10 VITALS
OXYGEN SATURATION: 94 % | TEMPERATURE: 97.6 F | SYSTOLIC BLOOD PRESSURE: 112 MMHG | HEART RATE: 106 BPM | DIASTOLIC BLOOD PRESSURE: 59 MMHG

## 2018-01-10 VITALS
SYSTOLIC BLOOD PRESSURE: 92 MMHG | TEMPERATURE: 97.3 F | DIASTOLIC BLOOD PRESSURE: 65 MMHG | HEART RATE: 74 BPM | RESPIRATION RATE: 20 BRPM | OXYGEN SATURATION: 97 %

## 2018-01-10 VITALS — OXYGEN SATURATION: 97 %

## 2018-01-10 VITALS
HEART RATE: 90 BPM | RESPIRATION RATE: 18 BRPM | TEMPERATURE: 97.4 F | OXYGEN SATURATION: 96 % | SYSTOLIC BLOOD PRESSURE: 76 MMHG | DIASTOLIC BLOOD PRESSURE: 51 MMHG

## 2018-01-10 VITALS — HEART RATE: 68 BPM

## 2018-01-10 VITALS
RESPIRATION RATE: 18 BRPM | TEMPERATURE: 97.5 F | OXYGEN SATURATION: 97 % | SYSTOLIC BLOOD PRESSURE: 89 MMHG | DIASTOLIC BLOOD PRESSURE: 52 MMHG | HEART RATE: 90 BPM

## 2018-01-10 VITALS
DIASTOLIC BLOOD PRESSURE: 56 MMHG | RESPIRATION RATE: 20 BRPM | SYSTOLIC BLOOD PRESSURE: 88 MMHG | HEART RATE: 90 BPM | OXYGEN SATURATION: 94 % | TEMPERATURE: 96 F

## 2018-01-10 VITALS
RESPIRATION RATE: 16 BRPM | DIASTOLIC BLOOD PRESSURE: 62 MMHG | TEMPERATURE: 97.3 F | SYSTOLIC BLOOD PRESSURE: 87 MMHG | HEART RATE: 93 BPM | OXYGEN SATURATION: 97 %

## 2018-01-10 VITALS — HEART RATE: 92 BPM

## 2018-01-10 LAB
BUN SERPL-MCNC: 30 MG/DL (ref 7–18)
CALCIUM SERPL-MCNC: 8.2 MG/DL (ref 8.5–10.1)
CHLORIDE SERPL-SCNC: 99 MEQ/L (ref 98–107)
COLOR UR: YELLOW
CREAT SERPL-MCNC: 1.34 MG/DL (ref 0.5–1)
CRP SERPL-MCNC: 1.1 MG/DL (ref 0–0.3)
GFR SERPLBLD BASED ON 1.73 SQ M-ARVRAT: 39 ML/MIN (ref 89–?)
GLUCOSE SERPL-MCNC: 82 MG/DL (ref 74–106)
GLUCOSE UR STRIP-MCNC: (no result) MG/DL
HCO3 BLD-SCNC: 16.8 MEQ/L (ref 21–32)
HGB UR QL STRIP: (no result)
HYALINE CASTS #/AREA URNS LPF: 20 /LPF
KETONES UR STRIP-MCNC: (no result) MG/DL
MUCOUS THREADS #/AREA URNS LPF: (no result) /LPF
NITRITE UR QL STRIP: (no result)
SODIUM SERPL-SCNC: 133 MEQ/L (ref 136–145)
SP GR UR STRIP: 1.02 (ref 1–1.03)
SQUAMOUS #/AREA URNS HPF: <1 /HPF (ref 0–5)
T3FREE SERPL-MCNC: 1.39 PG/ML (ref 2.18–3.98)
T4 FREE SERPL-MCNC: 1.41 NG/DL (ref 0.76–1.46)
TROPONIN I SERPL-MCNC: 0.19 NG/ML (ref 0.02–0.05)
URINE LEUKOCYTE ESTERASE: (no result)

## 2018-01-10 RX ADMIN — MAGNESIUM SULFATE IN DEXTROSE SCH MLS/HR: 10 INJECTION, SOLUTION INTRAVENOUS at 17:00

## 2018-01-10 RX ADMIN — Medication SCH ML: at 13:15

## 2018-01-10 RX ADMIN — ALBUMIN HUMAN SCH MLS/HR: 0.25 SOLUTION INTRAVENOUS at 13:15

## 2018-01-10 RX ADMIN — DEXTROSE MONOHYDRATE SCH MLS/HR: 5 INJECTION, SOLUTION INTRAVENOUS at 23:34

## 2018-01-10 RX ADMIN — DEXTROSE MONOHYDRATE SCH MLS/HR: 5 INJECTION, SOLUTION INTRAVENOUS at 18:28

## 2018-01-10 RX ADMIN — MAGNESIUM SULFATE IN DEXTROSE SCH MLS/HR: 10 INJECTION, SOLUTION INTRAVENOUS at 18:26

## 2018-01-10 RX ADMIN — Medication SCH ML: at 21:00

## 2018-01-10 RX ADMIN — ALBUMIN HUMAN SCH MLS/HR: 0.25 SOLUTION INTRAVENOUS at 23:33

## 2018-01-10 RX ADMIN — ASPIRIN 81 MG SCH MG: 81 TABLET ORAL at 13:15

## 2018-01-10 NOTE — EKG
Date Performed: 01/09/2018       Time Performed: 19:30:38

 

PTAGE:      72 years

 

EKG:      SINUS TACHYCARDIA POSSIBLE LEFT ATRIAL ENLARGEMENT MARKED LEFT AXIS DEVIATION LOW QRS VOLTA
GE POSSIBLE ANTERIOR AND INFERIOR MYOCARDIAL INFARCTION ABNORMAL ECG Compared to the 

 

 PREVIOUS TRACING             low voltage and anterior and inferior Q waves present

 

DOCTOR:   Adeline Randhawa  Interpretating Date/Time  01/10/2018 21:46:14

## 2018-01-10 NOTE — EKG
Date Performed: 01/10/2018       Time Performed: 03:47:08

 

PTAGE:      72 years

 

EKG:      Sinus arrhythmia Left axis deviation Inferior infarct - age undetermined Possible anterior 
infarct - age undetermined Generalized low QRS voltages Abnormal ECG

 

PREVIOUS TRACING       : 01/09/2018 19.30 Compared to prior tracing no significant change

 

DOCTOR:   Adeline Randhawa  Interpretating Date/Time  01/10/2018 21:18:15

## 2018-01-10 NOTE — MB
cc:

MELBA WOODS MD

****

 

 

DATE OF CONSULTATION

1/10/2018

 

HISTORY OF PRESENT ILLNESS

This is a 72-year-old woman who was admitted to the hospital for rather

significant weakness.  She has a history of nonischemic cardiomyopathy and was

admitted to the hospital just before Christmas for exacerbation of her CHF and

released on January 1.  She had been doing fairly well but has become

progressively weaker and has noted rather significant increase in her pedal

edema.  She has also had some weakness of her legs and notes that she is really

unable to ambulate.  She has chronic shortness of breath but this has not

significantly worsened.  No chest pain or palpitations have been present.  Her

medications at home have included only Lasix as her blood pressure generally

runs 85-90 systolic and she has been unable to tolerate ACE inhibitors or beta

blockers.

 

PAST MEDICAL HISTORY

Otherwise significant for arthritis.

 

SOCIAL HISTORY

The patient has a glass of wine 2-3 times per week.  She does not smoke or use

recreational drugs.

 

ALLERGIES

1. CODEINE.

2. PENICILLIN.

3. VANCOMYCIN.

 

MEDICATIONS

As noted above medications at home have included Lasix 40 mg daily and

potassium 10 mEq daily.

 

PHYSICAL EXAMINATION

GENERAL:  She is awake and alert.  She is in no acute distress, resting

comfortably in bed.

VITAL SIGNS:  Blood pressure 92/65, pulse 74.

NECK:  There is no neck vein distention.

LUNGS:  Essentially clear with decreased breath sounds in the right base.

CARDIOVASCULAR:  Regular rate and rhythm with no murmur or gallop.  There is +3

pedal edema.

ABDOMEN:  Mild epigastric tenderness is noted with fullness of her liver.

 

ASSESSMENT AND PLAN

The patient has cardiomyopathy with what appears to be significant right-sided

failure.  We will continue her furosemide and try a milrinone infusion to see

if we can stimulate diuresis and improve her overall fluid balance and improve

her feelings of weakness and tiredness.

 

 

                              _________________________________

                              MD PEG Chambers/ANGELICA

D:  1/10/2018/7:53 AM

T:  1/10/2018/8:28 AM

Visit #:  [id number]

Job #:  [id number]

## 2018-01-10 NOTE — HHI.PR
Subjective


Remarks


c/o increasing lower ext swelling and sob with exertion.





Objective


Vitals


heart reg


lung good air entry tete


abd s/nt


ext 2-3plus edema


Vital Signs








  Date Time  Temp Pulse Resp B/P (MAP) Pulse Ox O2 Delivery O2 Flow Rate FiO2


 


1/10/18 08:09 97.3 93 16 87/62 (70) 97   


 


1/10/18 06:00  92      


 


1/10/18 05:00 97.3 74 20 92/65 (74) 97   


 


1/10/18 05:00  92      


 


1/10/18 04:00  68      


 


1/10/18 01:00  90      


 


1/10/18 01:00 96.0 99 20 88/56 (67) 94   


 


1/10/18 00:34        


 


1/9/18 23:48     100 Room Air  


 


1/9/18 21:00  92 16 93/66 (75)    


 


1/9/18 20:17   18     


 


1/9/18 20:00  100 18 85/58 (67)    


 


1/9/18 18:39 97.4 88 20 91/58 (69) 95   








Result Diagram:  


1/9/18 1930 1/9/18 1930





Imaging





Last 72 hours Impressions








Chest X-Ray 1/9/18 1908 Signed





Impressions: 





 Service Date/Time:  Tuesday, January 9, 2018 19:39 - CONCLUSION:  Right 

greater 





 than left pleural effusions and basilar consolidation.     Demian Alegria MD 











A/P


Problem List:  


(1) CHF exacerbation


ICD Codes:  I50.9 - Heart failure, unspecified


Status:  Acute


Plan:  


1. acute systolic chf exacerbation. ef 20percent


2. hypotension 


3. renal insufficiency





seen by cardiology 


milrinone added to po lasix


bp to low for ace/bb


would consider adding scheduled iv albumen


dvt prophylaxis


PT eval





(2) Hypotension


ICD Codes:  I95.9 - Hypotension, unspecified


Status:  Acute


Plan:  see above








Problem Qualifiers





(1) CHF exacerbation:  


Qualified Codes:  I50.9 - Heart failure, unspecified


(2) Hypotension:  


Qualified Codes:  I95.9 - Hypotension, unspecified








Leander Muhammad MD Raymond 10, 2018 09:27

## 2018-01-11 VITALS
HEART RATE: 114 BPM | SYSTOLIC BLOOD PRESSURE: 82 MMHG | OXYGEN SATURATION: 95 % | RESPIRATION RATE: 18 BRPM | TEMPERATURE: 98.1 F | DIASTOLIC BLOOD PRESSURE: 57 MMHG

## 2018-01-11 VITALS
SYSTOLIC BLOOD PRESSURE: 92 MMHG | RESPIRATION RATE: 20 BRPM | TEMPERATURE: 98.1 F | DIASTOLIC BLOOD PRESSURE: 60 MMHG | HEART RATE: 119 BPM | OXYGEN SATURATION: 94 %

## 2018-01-11 VITALS
RESPIRATION RATE: 14 BRPM | SYSTOLIC BLOOD PRESSURE: 87 MMHG | HEART RATE: 104 BPM | DIASTOLIC BLOOD PRESSURE: 49 MMHG | OXYGEN SATURATION: 95 % | TEMPERATURE: 97.4 F

## 2018-01-11 VITALS
OXYGEN SATURATION: 95 % | RESPIRATION RATE: 16 BRPM | HEART RATE: 110 BPM | SYSTOLIC BLOOD PRESSURE: 82 MMHG | DIASTOLIC BLOOD PRESSURE: 57 MMHG | TEMPERATURE: 97.6 F

## 2018-01-11 VITALS — OXYGEN SATURATION: 97 %

## 2018-01-11 VITALS
DIASTOLIC BLOOD PRESSURE: 46 MMHG | HEART RATE: 104 BPM | TEMPERATURE: 97.9 F | SYSTOLIC BLOOD PRESSURE: 95 MMHG | RESPIRATION RATE: 16 BRPM | OXYGEN SATURATION: 99 %

## 2018-01-11 VITALS — HEART RATE: 127 BPM

## 2018-01-11 VITALS — OXYGEN SATURATION: 96 %

## 2018-01-11 VITALS — HEART RATE: 119 BPM

## 2018-01-11 LAB
BUN SERPL-MCNC: 27 MG/DL (ref 7–18)
CALCIUM SERPL-MCNC: 8.2 MG/DL (ref 8.5–10.1)
CHLORIDE SERPL-SCNC: 100 MEQ/L (ref 98–107)
CREAT SERPL-MCNC: 1.19 MG/DL (ref 0.5–1)
GFR SERPLBLD BASED ON 1.73 SQ M-ARVRAT: 45 ML/MIN (ref 89–?)
GLUCOSE SERPL-MCNC: 92 MG/DL (ref 74–106)
HCO3 BLD-SCNC: 23.2 MEQ/L (ref 21–32)
MAGNESIUM SERPL-MCNC: 1.6 MG/DL (ref 1.5–2.5)
SODIUM SERPL-SCNC: 137 MEQ/L (ref 136–145)

## 2018-01-11 RX ADMIN — DEXTROSE MONOHYDRATE SCH MLS/HR: 5 INJECTION, SOLUTION INTRAVENOUS at 14:50

## 2018-01-11 RX ADMIN — POTASSIUM CHLORIDE SCH MEQ: 20 TABLET, EXTENDED RELEASE ORAL at 10:52

## 2018-01-11 RX ADMIN — POTASSIUM CHLORIDE SCH MEQ: 1.5 POWDER, FOR SOLUTION ORAL at 18:00

## 2018-01-11 RX ADMIN — POTASSIUM CHLORIDE SCH MEQ: 20 TABLET, EXTENDED RELEASE ORAL at 14:58

## 2018-01-11 RX ADMIN — Medication SCH ML: at 21:33

## 2018-01-11 RX ADMIN — FUROSEMIDE SCH MG: 10 INJECTION, SOLUTION INTRAMUSCULAR; INTRAVENOUS at 09:47

## 2018-01-11 RX ADMIN — FUROSEMIDE SCH MG: 10 INJECTION, SOLUTION INTRAMUSCULAR; INTRAVENOUS at 17:41

## 2018-01-11 RX ADMIN — ALBUMIN HUMAN SCH MLS/HR: 0.25 SOLUTION INTRAVENOUS at 17:41

## 2018-01-11 RX ADMIN — Medication SCH ML: at 09:47

## 2018-01-11 RX ADMIN — ASPIRIN 81 MG SCH MG: 81 TABLET ORAL at 09:46

## 2018-01-11 RX ADMIN — ALBUMIN HUMAN SCH MLS/HR: 0.25 SOLUTION INTRAVENOUS at 09:46

## 2018-01-11 NOTE — HHI.PR
Subjective


Remarks


no new complaints


refused herrera





Objective


Vitals


nad


heart reg


lung cta


abd s/nt


ext 2-3plus pitting edema


Vital Signs








  Date Time  Temp Pulse Resp B/P (MAP) Pulse Ox O2 Delivery O2 Flow Rate FiO2


 


1/11/18 03:00 97.9 104 16 95/46 (62) 99   


 


1/11/18 03:00  114      


 


1/10/18 23:34  94  81/66    


 


1/10/18 23:00  101      


 


1/10/18 23:00 98.0 99 16 91/66 (74) 99   


 


1/10/18 20:45     94 Room Air  


 


1/10/18 20:44 97.6 106  112/59 (76) 94   


 


1/10/18 19:00  98      


 


1/10/18 18:28  99  101/64    


 


1/10/18 15:20 97.5 90 18 89/52 (64) 97   


 


1/10/18 15:00  96      


 


1/10/18 12:00  96      


 


1/10/18 11:00 97.4 90 18 76/51 (59) 96   


 


1/10/18 10:00  98      


 


1/10/18 09:57     97   21








Result Diagram:  


1/9/18 1930                                                                    

            1/10/18 1350





Imaging





Last 72 hours Impressions








Chest X-Ray 1/9/18 1908 Signed





Impressions: 





 Service Date/Time:  Tuesday, January 9, 2018 19:39 - CONCLUSION:  Right 

greater 





 than left pleural effusions and basilar consolidation.     Demian Alegria MD 











A/P


Problem List:  


(1) CHF exacerbation


ICD Codes:  I50.9 - Heart failure, unspecified


Status:  Acute


Plan:  


1. acute systolic chf exacerbation. ef 20percent


2. hypotension 


3. renal insufficiency





seen by cardiology 


moved to cardiac icu


milrinone/iv lasix/iv albumen


monitor urine output and bmp


bp to low for ace/bb


dvt prophylaxis


PT eval





(2) Hypotension


ICD Codes:  I95.9 - Hypotension, unspecified


Status:  Acute


Plan:  see above








Problem Qualifiers





(1) CHF exacerbation:  


Qualified Codes:  I50.9 - Heart failure, unspecified


(2) Hypotension:  


Qualified Codes:  I95.9 - Hypotension, unspecified








Leander Muhammad MD Jan 11, 2018 09:11

## 2018-01-12 VITALS
TEMPERATURE: 98.1 F | RESPIRATION RATE: 16 BRPM | OXYGEN SATURATION: 94 % | HEART RATE: 99 BPM | DIASTOLIC BLOOD PRESSURE: 67 MMHG | SYSTOLIC BLOOD PRESSURE: 93 MMHG

## 2018-01-12 VITALS
OXYGEN SATURATION: 96 % | SYSTOLIC BLOOD PRESSURE: 117 MMHG | RESPIRATION RATE: 20 BRPM | HEART RATE: 128 BPM | DIASTOLIC BLOOD PRESSURE: 70 MMHG | TEMPERATURE: 97.5 F

## 2018-01-12 VITALS
DIASTOLIC BLOOD PRESSURE: 67 MMHG | HEART RATE: 108 BPM | OXYGEN SATURATION: 96 % | TEMPERATURE: 98 F | RESPIRATION RATE: 16 BRPM | SYSTOLIC BLOOD PRESSURE: 93 MMHG

## 2018-01-12 VITALS
DIASTOLIC BLOOD PRESSURE: 60 MMHG | RESPIRATION RATE: 18 BRPM | OXYGEN SATURATION: 96 % | TEMPERATURE: 97.8 F | SYSTOLIC BLOOD PRESSURE: 98 MMHG | HEART RATE: 124 BPM

## 2018-01-12 VITALS
OXYGEN SATURATION: 94 % | RESPIRATION RATE: 14 BRPM | TEMPERATURE: 97.8 F | DIASTOLIC BLOOD PRESSURE: 60 MMHG | HEART RATE: 135 BPM | SYSTOLIC BLOOD PRESSURE: 88 MMHG

## 2018-01-12 VITALS
DIASTOLIC BLOOD PRESSURE: 56 MMHG | TEMPERATURE: 98.3 F | OXYGEN SATURATION: 95 % | HEART RATE: 140 BPM | RESPIRATION RATE: 14 BRPM | SYSTOLIC BLOOD PRESSURE: 91 MMHG

## 2018-01-12 VITALS
OXYGEN SATURATION: 99 % | HEART RATE: 119 BPM | TEMPERATURE: 98.1 F | SYSTOLIC BLOOD PRESSURE: 90 MMHG | RESPIRATION RATE: 20 BRPM | DIASTOLIC BLOOD PRESSURE: 56 MMHG

## 2018-01-12 VITALS — HEART RATE: 121 BPM

## 2018-01-12 VITALS — OXYGEN SATURATION: 98 %

## 2018-01-12 VITALS — OXYGEN SATURATION: 96 %

## 2018-01-12 LAB
BUN SERPL-MCNC: 23 MG/DL (ref 7–18)
CALCIUM SERPL-MCNC: 8 MG/DL (ref 8.5–10.1)
CHLORIDE SERPL-SCNC: 107 MEQ/L (ref 98–107)
CREAT SERPL-MCNC: 0.98 MG/DL (ref 0.5–1)
GFR SERPLBLD BASED ON 1.73 SQ M-ARVRAT: 56 ML/MIN (ref 89–?)
GLUCOSE SERPL-MCNC: 100 MG/DL (ref 74–106)
HCO3 BLD-SCNC: 20.5 MEQ/L (ref 21–32)
SODIUM SERPL-SCNC: 140 MEQ/L (ref 136–145)

## 2018-01-12 RX ADMIN — FUROSEMIDE SCH MG: 10 INJECTION, SOLUTION INTRAMUSCULAR; INTRAVENOUS at 17:30

## 2018-01-12 RX ADMIN — Medication SCH ML: at 20:21

## 2018-01-12 RX ADMIN — POTASSIUM CHLORIDE SCH MEQ: 1.5 POWDER, FOR SOLUTION ORAL at 08:57

## 2018-01-12 RX ADMIN — ASPIRIN 81 MG SCH MG: 81 TABLET ORAL at 08:58

## 2018-01-12 RX ADMIN — FUROSEMIDE SCH MG: 10 INJECTION, SOLUTION INTRAMUSCULAR; INTRAVENOUS at 08:57

## 2018-01-12 RX ADMIN — DEXTROSE MONOHYDRATE SCH MLS/HR: 5 INJECTION, SOLUTION INTRAVENOUS at 13:56

## 2018-01-12 RX ADMIN — DEXTROSE MONOHYDRATE SCH MLS/HR: 5 INJECTION, SOLUTION INTRAVENOUS at 03:08

## 2018-01-12 RX ADMIN — ONDANSETRON PRN MG: 2 INJECTION, SOLUTION INTRAMUSCULAR; INTRAVENOUS at 01:30

## 2018-01-12 RX ADMIN — POTASSIUM CHLORIDE SCH MEQ: 1.5 POWDER, FOR SOLUTION ORAL at 13:00

## 2018-01-12 RX ADMIN — Medication SCH ML: at 08:58

## 2018-01-12 RX ADMIN — POTASSIUM CHLORIDE SCH MEQ: 1.5 POWDER, FOR SOLUTION ORAL at 17:30

## 2018-01-12 RX ADMIN — ALBUMIN HUMAN SCH MLS/HR: 0.25 SOLUTION INTRAVENOUS at 17:30

## 2018-01-12 RX ADMIN — DEXTROSE MONOHYDRATE SCH MLS/HR: 5 INJECTION, SOLUTION INTRAVENOUS at 20:30

## 2018-01-12 RX ADMIN — ALBUMIN HUMAN SCH MLS/HR: 0.25 SOLUTION INTRAVENOUS at 08:56

## 2018-01-12 NOTE — RADRPT
EXAM DATE/TIME:  01/12/2018 07:54 

 

[facility] COMPARISON:     

CT ABDOMEN & PELVIS W CONTRAST, January 09, 2018, 21:30.  CHEST SINGLE AP, January 09, 2018, 19:39.

 

                     

INDICATIONS :     

Shortness of breath- Congestive heart failure.

                     

 

MEDICAL HISTORY :            

Hypertension. Hernia, hiatal. Gastroesophageal reflux disease.IBS.    

 

SURGICAL HISTORY :        

Cholecystectomy. Bilateral hip replacements. 

 

ENCOUNTER:     

Subsequent                                        

 

ACUITY:     

1 day      

 

PAIN SCORE:     

0/10

 

LOCATION:     

Bilateral chest 

 

FINDINGS:     

Portable AP view of the chest demonstrate stable enlargement of the cardiac silhouette with calcifica
tion of the aorta. There are persistent pleural-parenchymal opacities at the lung bases bilaterally, 
right larger than left. The right basilar opacity appears slightly increased. No pneumothorax is iden
tified. Bones and soft tissues demonstrate no acute finding.

 

CONCLUSION:     

1. Persistent bilateral effusions with associated airspace consolidation or atelectasis. Right pleura
l effusion is larger than left and is slightly larger when compared to the study from 3 days ago.

2. Stable enlargement of the cardiac silhouette.

 

 

 

 Demian Evans MD on January 12, 2018 at 8:18           

Board Certified Radiologist.

 This report was verified electronically.

## 2018-01-12 NOTE — PD.CARD.PN
Subjective


Subjective Remarks


feels better. edema much improved





Objective


Medications





Current Medications








 Medications


  (Trade)  Dose


 Ordered  Sig/Sulaiman


 Route  Start Time


 Stop Time Status Last Admin


 


  (NS Flush)  2 ml  BID


 IV FLUSH  1/10/18 09:00


    1/11/18 21:33


 


 


  (NS Flush)  2 ml  UNSCH  PRN


 IV FLUSH  1/9/18 22:15


     


 


 


  (Aspirin Chew)  81 mg  DAILY


 CHEW  1/10/18 09:00


    1/11/18 09:46


 


 


  (Zofran Inj)  4 mg  Q6HR  PRN


 IV PUSH  1/9/18 22:30


    1/12/18 01:30


 


 


 Milrinone Lactate


 20 mg/Sodium


 Chloride  100 ml @ 


 8.55 mls/hr  [id number]


 IV  1/10/18 10:00


    1/12/18 03:08


 


 


  (Lasix Inj)  40 mg  BID@09,18


 IV PUSH  1/11/18 09:00


    1/11/18 17:41


 


 


 Albumin Human  100 ml @ 


 60 mls/hr  BID@0830,1730


 IV  1/11/18 08:30


    1/11/18 17:41


 


 


  (KCl Powder)  20 meq  TID


 PO  1/11/18 18:00


    1/11/18 18:00


 








Vital Signs / I&O





Vital Signs








  Date Time  Temp Pulse Resp B/P (MAP) Pulse Ox O2 Delivery O2 Flow Rate FiO2


 


1/12/18 04:00     96 Nasal Cannula 2.00 


 


1/12/18 04:00 97.5 128 20 117/70 (86) 96   


 


1/12/18 03:30  121      


 


1/12/18 03:08  121  99/66    


 


1/12/18 00:00 98.1 119 20 90/56 (67) 99   


 


1/12/18 00:00     99 Nasal Cannula 2.00 


 


1/11/18 23:30  127      


 


1/11/18 21:00     98 Nasal Cannula 3.00 


 


1/11/18 20:50     96 Nasal Cannula 3.00 


 


1/11/18 20:00     94 Nasal Cannula 4.00 


 


1/11/18 20:00 98.1 119 20 92/60 (71) 94   


 


1/11/18 19:00  119      


 


1/11/18 15:00 97.6 123 16 82/57 (65) 95   


 


1/11/18 15:00  110      


 


1/11/18 14:50  108  97/68    


 


1/11/18 11:00 98.1 114 18 82/57 (65) 95   


 


1/11/18 11:00  110      


 


1/11/18 10:29     97 Nasal Cannula 3.00 














I/O      


 


 1/11/18 1/11/18 1/11/18 1/12/18 1/12/18 1/12/18





 07:00 15:00 23:00 07:00 15:00 23:00


 


Intake Total 940 ml 100 ml 942 ml 396 ml  


 


Output Total 475 ml  675 ml 625 ml  


 


Balance 465 ml 100 ml 267 ml -229 ml  


 


      


 


Intake Oral 720 ml  730 ml 300 ml  


 


IV Total 220 ml 100 ml 212 ml 96 ml  


 


Output Urine Total 475 ml  675 ml 625 ml  


 


# Bowel Movements 0  0 1  








Physical Exam


Lungs essentially clear


+1 pedal edema


weight down 3 lbs.


Laboratory





Laboratory Tests








Test


  1/11/18


09:35 1/11/18


18:05 1/12/18


04:25


 


Blood Urea Nitrogen 27 MG/DL   23 MG/DL 


 


Creatinine 1.19 MG/DL   0.98 MG/DL 


 


Random Glucose 92 MG/DL   100 MG/DL 


 


Calcium Level 8.2 MG/DL   8.0 MG/DL 


 


Magnesium Level 1.6 MG/DL   


 


Sodium Level 137 MEQ/L   140 MEQ/L 


 


Potassium Level 2.7 MEQ/L  4.4 MEQ/L  3.5 MEQ/L 


 


Chloride Level 100 MEQ/L   107 MEQ/L 


 


Carbon Dioxide Level 23.2 MEQ/L   20.5 MEQ/L 


 


Anion Gap 14 MEQ/L   13 MEQ/L 


 


Estimat Glomerular Filtration


Rate 45 ML/MIN 


  


  56 ML/MIN 


 











Assessment and Plan


Assessment and Plan


Increase activity. Will probably stop milrinone today. Recheck Chest X-ray











Rafiq Niño MD Jan 12, 2018 07:48

## 2018-01-12 NOTE — HHI.PR
Subjective


Remarks


leg edema improved. ok to try stockings for pedal edema





Objective


Vitals


heart reg


lung diminished bases


abd s/nt


ext persistent pedal edema but


 leg edema much improved


Vital Signs








  Date Time  Temp Pulse Resp B/P (MAP) Pulse Ox O2 Delivery O2 Flow Rate FiO2


 


1/12/18 04:00     96 Nasal Cannula 2.00 


 


1/12/18 04:00 97.5 128 20 117/70 (86) 96   


 


1/12/18 03:30  121      


 


1/12/18 03:08  121  99/66    


 


1/12/18 00:00 98.1 119 20 90/56 (67) 99   


 


1/12/18 00:00     99 Nasal Cannula 2.00 


 


1/11/18 23:30  127      


 


1/11/18 21:00     98 Nasal Cannula 3.00 


 


1/11/18 20:50     96 Nasal Cannula 3.00 


 


1/11/18 20:00     94 Nasal Cannula 4.00 


 


1/11/18 20:00 98.1 119 20 92/60 (71) 94   


 


1/11/18 19:00  119      


 


1/11/18 15:00 97.6 123 16 82/57 (65) 95   


 


1/11/18 15:00  110      


 


1/11/18 14:50  108  97/68    


 


1/11/18 11:00 98.1 114 18 82/57 (65) 95   


 


1/11/18 11:00  110      


 


1/11/18 10:29     97 Nasal Cannula 3.00 








Result Diagram:  


1/9/18 1930                                                                    

            1/12/18 0425





Imaging





Last 72 hours Impressions








Chest X-Ray 1/9/18 1908 Signed





Impressions: 





 Service Date/Time:  Tuesday, January 9, 2018 19:39 - CONCLUSION:  Right 

greater 





 than left pleural effusions and basilar consolidation.     Demian Alegria MD 











A/P


Problem List:  


(1) CHF exacerbation


ICD Codes:  I50.9 - Heart failure, unspecified


Status:  Acute


Plan:  


1. acute systolic chf exacerbation. ef 20percent


2. hypotension 


3. renal insufficiency





seen by cardiology 


moved to cardiac icu


milrinone/iv lasix/iv albumen


monitor urine output and bmp


bp to low for ace/bb


dvt prophylaxis


PT eval


leg stockings .elevate legs.





(2) Hypotension


ICD Codes:  I95.9 - Hypotension, unspecified


Status:  Acute


Plan:  see above








Problem Qualifiers





(1) CHF exacerbation:  


Qualified Codes:  I50.9 - Heart failure, unspecified


(2) Hypotension:  


Qualified Codes:  I95.9 - Hypotension, unspecified








Leander Muhammad MD Jan 12, 2018 09:21

## 2018-01-13 VITALS
TEMPERATURE: 98 F | RESPIRATION RATE: 19 BRPM | SYSTOLIC BLOOD PRESSURE: 120 MMHG | OXYGEN SATURATION: 95 % | HEART RATE: 131 BPM | DIASTOLIC BLOOD PRESSURE: 66 MMHG

## 2018-01-13 VITALS
RESPIRATION RATE: 17 BRPM | DIASTOLIC BLOOD PRESSURE: 64 MMHG | TEMPERATURE: 98.9 F | SYSTOLIC BLOOD PRESSURE: 122 MMHG | HEART RATE: 125 BPM | OXYGEN SATURATION: 90 %

## 2018-01-13 VITALS
RESPIRATION RATE: 27 BRPM | SYSTOLIC BLOOD PRESSURE: 117 MMHG | DIASTOLIC BLOOD PRESSURE: 75 MMHG | TEMPERATURE: 98 F | OXYGEN SATURATION: 90 % | HEART RATE: 138 BPM

## 2018-01-13 VITALS
DIASTOLIC BLOOD PRESSURE: 58 MMHG | HEART RATE: 123 BPM | OXYGEN SATURATION: 98 % | RESPIRATION RATE: 14 BRPM | TEMPERATURE: 98.1 F | SYSTOLIC BLOOD PRESSURE: 90 MMHG

## 2018-01-13 VITALS
SYSTOLIC BLOOD PRESSURE: 122 MMHG | DIASTOLIC BLOOD PRESSURE: 65 MMHG | HEART RATE: 122 BPM | RESPIRATION RATE: 19 BRPM | TEMPERATURE: 98.9 F | OXYGEN SATURATION: 95 %

## 2018-01-13 VITALS — HEART RATE: 125 BPM

## 2018-01-13 VITALS — HEART RATE: 122 BPM

## 2018-01-13 VITALS — OXYGEN SATURATION: 93 %

## 2018-01-13 VITALS — OXYGEN SATURATION: 94 %

## 2018-01-13 VITALS — HEART RATE: 131 BPM

## 2018-01-13 LAB
ALBUMIN SERPL-MCNC: 4.7 GM/DL (ref 3.4–5)
ALP SERPL-CCNC: 90 U/L (ref 45–117)
ALT SERPL-CCNC: 25 U/L (ref 10–53)
AST SERPL-CCNC: 130 U/L (ref 15–37)
BASOPHILS # BLD AUTO: 0 TH/MM3 (ref 0–0.2)
BASOPHILS NFR BLD: 0.2 % (ref 0–2)
BILIRUB SERPL-MCNC: 3.3 MG/DL (ref 0.2–1)
BUN SERPL-MCNC: 25 MG/DL (ref 7–18)
BUN SERPL-MCNC: 28 MG/DL (ref 7–18)
CALCIUM SERPL-MCNC: 7.9 MG/DL (ref 8.5–10.1)
CALCIUM SERPL-MCNC: 8.8 MG/DL (ref 8.5–10.1)
CHLORIDE SERPL-SCNC: 101 MEQ/L (ref 98–107)
CHLORIDE SERPL-SCNC: 104 MEQ/L (ref 98–107)
CREAT SERPL-MCNC: 0.99 MG/DL (ref 0.5–1)
CREAT SERPL-MCNC: 1.61 MG/DL (ref 0.5–1)
EOSINOPHIL # BLD: 0 TH/MM3 (ref 0–0.4)
EOSINOPHIL NFR BLD: 0 % (ref 0–4)
ERYTHROCYTE [DISTWIDTH] IN BLOOD BY AUTOMATED COUNT: 18 % (ref 11.6–17.2)
GFR SERPLBLD BASED ON 1.73 SQ M-ARVRAT: 32 ML/MIN (ref 89–?)
GFR SERPLBLD BASED ON 1.73 SQ M-ARVRAT: 55 ML/MIN (ref 89–?)
GLUCOSE SERPL-MCNC: 116 MG/DL (ref 74–106)
GLUCOSE SERPL-MCNC: 76 MG/DL (ref 74–106)
HCO3 BLD-SCNC: 12 MEQ/L (ref 21–32)
HCO3 BLD-SCNC: 25.1 MEQ/L (ref 21–32)
HCT VFR BLD CALC: 37.8 % (ref 35–46)
HGB BLD-MCNC: 12.3 GM/DL (ref 11.6–15.3)
INR PPP: 1.9 RATIO
LYMPHOCYTES # BLD AUTO: 0.9 TH/MM3 (ref 1–4.8)
LYMPHOCYTES NFR BLD AUTO: 9.4 % (ref 9–44)
MAGNESIUM SERPL-MCNC: 1.2 MG/DL (ref 1.5–2.5)
MAGNESIUM SERPL-MCNC: 2.2 MG/DL (ref 1.5–2.5)
MCH RBC QN AUTO: 29.9 PG (ref 27–34)
MCHC RBC AUTO-ENTMCNC: 32.6 % (ref 32–36)
MCV RBC AUTO: 91.8 FL (ref 80–100)
MONOCYTE #: 0.7 TH/MM3 (ref 0–0.9)
MONOCYTES NFR BLD: 6.8 % (ref 0–8)
NEUTROPHILS # BLD AUTO: 8.5 TH/MM3 (ref 1.8–7.7)
NEUTROPHILS NFR BLD AUTO: 83.6 % (ref 16–70)
PHOSPHATE SERPL-MCNC: 2.5 MG/DL (ref 2.5–4.9)
PLATELET # BLD: 84 TH/MM3 (ref 150–450)
PMV BLD AUTO: 9.7 FL (ref 7–11)
PROT SERPL-MCNC: 7.5 GM/DL (ref 6.4–8.2)
PROTHROMBIN TIME: 19.5 SEC (ref 9.8–11.6)
RBC # BLD AUTO: 4.11 MIL/MM3 (ref 4–5.3)
SODIUM SERPL-SCNC: 140 MEQ/L (ref 136–145)
SODIUM SERPL-SCNC: 140 MEQ/L (ref 136–145)
WBC # BLD AUTO: 10.1 TH/MM3 (ref 4–11)

## 2018-01-13 RX ADMIN — POTASSIUM CHLORIDE SCH MEQ: 1.5 POWDER, FOR SOLUTION ORAL at 08:57

## 2018-01-13 RX ADMIN — MAGNESIUM SULFATE IN DEXTROSE SCH MLS/HR: 10 INJECTION, SOLUTION INTRAVENOUS at 11:00

## 2018-01-13 RX ADMIN — ALBUMIN HUMAN SCH MLS/HR: 0.25 SOLUTION INTRAVENOUS at 16:52

## 2018-01-13 RX ADMIN — DEXTROSE MONOHYDRATE SCH MLS/HR: 5 INJECTION, SOLUTION INTRAVENOUS at 08:12

## 2018-01-13 RX ADMIN — POTASSIUM CHLORIDE SCH MEQ: 1.5 POWDER, FOR SOLUTION ORAL at 13:00

## 2018-01-13 RX ADMIN — DEXTROSE MONOHYDRATE SCH MLS/HR: 5 INJECTION, SOLUTION INTRAVENOUS at 18:00

## 2018-01-13 RX ADMIN — ONDANSETRON PRN MG: 2 INJECTION, SOLUTION INTRAMUSCULAR; INTRAVENOUS at 16:13

## 2018-01-13 RX ADMIN — Medication SCH ML: at 20:41

## 2018-01-13 RX ADMIN — FUROSEMIDE SCH MG: 10 INJECTION, SOLUTION INTRAMUSCULAR; INTRAVENOUS at 16:53

## 2018-01-13 RX ADMIN — DEXTROSE MONOHYDRATE SCH MLS/HR: 5 INJECTION, SOLUTION INTRAVENOUS at 17:11

## 2018-01-13 RX ADMIN — PANTOPRAZOLE SODIUM SCH MG: 40 INJECTION, POWDER, FOR SOLUTION INTRAVENOUS at 20:40

## 2018-01-13 RX ADMIN — FUROSEMIDE SCH MG: 10 INJECTION, SOLUTION INTRAMUSCULAR; INTRAVENOUS at 08:56

## 2018-01-13 RX ADMIN — ALBUMIN HUMAN SCH MLS/HR: 0.25 SOLUTION INTRAVENOUS at 08:30

## 2018-01-13 RX ADMIN — MAGNESIUM SULFATE IN DEXTROSE SCH MLS/HR: 10 INJECTION, SOLUTION INTRAVENOUS at 12:00

## 2018-01-13 RX ADMIN — ASPIRIN 81 MG SCH MG: 81 TABLET ORAL at 08:56

## 2018-01-13 RX ADMIN — Medication SCH ML: at 08:56

## 2018-01-13 RX ADMIN — POTASSIUM CHLORIDE SCH MEQ: 1.5 POWDER, FOR SOLUTION ORAL at 18:00

## 2018-01-13 NOTE — PD.CONS
[facility]


Service


Critical Care Medicine


Consult Requested By


Dr. Mccall


Reason for Consult


Metabolic acidosis


Primary Care Physician


Fito Martin M.D.


History of Present Illness


History of Present Illness





72-year-old female with a medical history significant for CHF, cardiomyopathy 

who was admitted initially at [facility] from December 25 21


And subsequently discharged.  She has been very weak since her discharge and 

has had diarrhea for several months.  She has also had nausea and poor appetite 

as well as long-standing shortness of breath.  She presented back on January 9, 2018 to [facility] ER with generalized weakness as well as some shortness 

of breath.  She had significant edema in her lower extremities.  She was 

reportedly admitted with a sepsis at [facility] in December 2017 and CHF.

  Her LVEF is noted to be 20%.  During previous hospitalization she was treated 

with IV antibiotics and pressors.  She was also treated with antibiotics at 

that time.


Patient was admitted by [facility] on January 9 and was 

evaluated by cardiology.  She was initiated on diuretics, IV albumin as well as 

milrinone gtt.  Initially she received Rocephin and Flagyl which was 

subsequently stopped.


Milrinone gtt. was titrated off at 12 noon on 1/13.  Patient developed 

worsening shortness of breath and some altered mental status and abdominal pain 

subsequently.  She remained on 2 L nasal cannula however was slightly 

tachypneic.  ABG was done for further evaluation around 6 PM which revealed 

severe metabolic acidosis with pH 7.16, PCO2 18, PO2 98 and bicarbonate 6. 

Critical care was consulted on 1/13 around 6:30 PM.  Dr. Mccall was contacted by 

ICU RN N ordered 5 amps of sodium bicarbonate IV push.  Patient's milrinone was 

resumed at 0.5 mics per KG per minute.  I evaluated the patient after being 

notified of the consult and ordered stat labs and chest x-ray.  When I 

evaluated the patient she was encephalopathic/lethargic with tachypnea.  She 

had just received 5 A of sodium bicarbonate IV push.  Stat labs are pending.





 ROS - General 


Review of Systems


 Unable to be obtained due to altered mental status/disorientation





 PFSH 


Past Family Social History


Past Medical History


Cardiomyopathy with an EF around 20% based on echo done in December 2017


Chronic bronchitis


Colitis with chronic diarrhea of unknown etiology


Fatty liver


GERD


Hypertension


Hyperlipidemia


Hyperthyroidism


Hypomagnesemia


Peripheral neuropathy


Polycythemia vera Hunter history of sepsis with prior infection with MRSA and 

strep a


B12 and vitamin D deficiency


Past Surgical History


Cataract surgery


Cholecystectomy


Total hip replacement on the right


Ureterostomy


Ventral hernia repair in 1996


Reported Medications


Zyrtec Allergy (Cetirizine HCl) 10 Mg Cap 2.5 Mg PO DAILY


Tramadol (Tramadol HCl) 50 Mg Tab 50 Mg PO Q8H PRN


Vitamin D-3 (Cholecalciferol) 1,000 Unit Tab 1,000 Units PO DAILY


Chlorthalidone 25 Mg Tab 25 Mg PO DAILY


Nifedipine ER 24 HR (Nifedipine) 30 Mg Tab 30 Mg PO DAILY


Pantoprazole (Pantoprazole Sodium) 40 Mg Tab 40 Mg PO DAILY


K-Tab (Potassium Chloride) 10 Meq Tab 10 Meq PO DAILY


Mobic (Meloxicam) 15 Mg Tab 15 Mg PO HERMANN


Allergies:  


Coded Allergies:  


     codeine (Unverified  Allergy, Severe, ITCH, 8/15/17)


     penicillin G (Unverified  Allergy, Severe, RASH, 8/15/17)


     vancomycin (Unverified  Allergy, Severe, HIVES, EDEMA/SWELLING, 8/15/17)


Uncoded Allergies:  


     ANGIOTENSIN RECEPTOR BLOCKER (Adverse Reaction, Severe, Cough, 1/5/17)





Administered Medications








 Medications


  (Trade)  Dose


 Ordered  Sig/Sulaiman


 Route


 PRN Reason  Start Time


 Stop Time Status Last Admin


Dose Admin


 


 Sodium Chloride


  (NS Flush)  2 ml  BID


 IV FLUSH


   1/10/18 09:00


    1/13/18 08:56


 


 


 Aspirin


  (Aspirin Chew)  81 mg  DAILY


 CHEW


   1/10/18 09:00


    1/13/18 08:56


 


 


 Ondansetron HCl


  (Zofran Inj)  4 mg  Q6HR  PRN


 IV PUSH


 n,v  1/9/18 22:30


    1/13/18 16:13


 


 


 Furosemide


  (Lasix Inj)  40 mg  BID@09,18


 IV PUSH


   1/11/18 09:00


    1/13/18 16:53


 


 


 Albumin Human  100 ml @ 


 60 mls/hr  BID@0830,1730


 IV


   1/11/18 08:30


    1/13/18 16:52


 


 


 Potassium Chloride


  (KCl Powder)  20 meq  TID


 PO


   1/11/18 18:00


    1/13/18 18:00


 


 


 Milrinone Lactate


 20 mg/Sodium


 Chloride  100 ml @ 


 8.62 mls/hr  [id number]


 IV


   1/13/18 18:00


    1/13/18 18:00


 








Family History


Noncontributory


Social History


No tobacco ever


Did previously drink alcohol 2-3 times per week but hasn't drank anything the 

last week or so


Lives alone but her son lives close by just down the street


Originally from [place], moved here in 1974


Previously a  and principal





Physical Exam


Vital Signs





Vital Signs








  Date Time  Temp Pulse Resp B/P (MAP) Pulse Ox O2 Delivery O2 Flow Rate FiO2


 


1/13/18 19:27     94 Nasal Cannula 2.00 


 


1/13/18 18:00  130  110/77    


 


1/13/18 17:11  140  82/30    


 


1/13/18 15:13     95 Room Air  


 


1/13/18 15:12  131      


 


1/13/18 15:11 98.0 131 19 120/66 (84) 95   


 


1/13/18 11:24     95 Room Air  


 


1/13/18 11:23  122      


 


1/13/18 11:22 98.9 122 19 122/65 (84) 95   


 


1/13/18 07:55     93   21


 


1/13/18 07:53     90 Room Air  


 


1/13/18 07:52  125      


 


1/13/18 07:46 98.9 125 17 122/64 (83) 90   


 


1/13/18 03:00 98.1 123 14 90/58 (69) 98   


 


1/13/18 03:00     98 Nasal Cannula 2.00 


 


1/13/18 03:00  123      


 


1/12/18 23:00  135      


 


1/12/18 23:00 97.8 135 14 88/60 (69) 94   


 


1/12/18 23:00     94 Room Air  


 


1/12/18 20:30  122  97/66    








Physical Exam


HEENT/Neuro: No pallor or icterus, tongue moist, LOUISA, drowsy, arousable, 

disoriented, not following commands nonfocal grossly, moving all 4 extremities


Neck: No JVD


Chest/pulmonary: Air entry decreased bilaterally at bases, scattered rhonchi, 

no wheezing


Cardiovascular: Tachycardic with heart rate 140s, S1-S2 regular no gallop or 

murmur


GI/abdomen: Soft, nontender, bowel sounds not appreciated


Extremities: Warm bilaterally, bilateral edema


Laboratory





Laboratory Tests








Test


  1/13/18


04:10 1/13/18


18:00


 


Blood Urea Nitrogen 25  


 


Creatinine 0.99  


 


Random Glucose 116  


 


Calcium Level 7.9  


 


Magnesium Level 1.2  


 


Sodium Level 140  


 


Potassium Level 3.7  


 


Chloride Level 104  


 


Carbon Dioxide Level 25.1  


 


Anion Gap 11  


 


Estimat Glomerular Filtration


Rate 55 


  


 


 


Blood Gas Puncture Site  LT RADIAL 


 


Blood Gas Patient Temperature  98.6 


 


Blood Gas HCO3  6 


 


Blood Gas Base Excess  -20.8 


 


Blood Gas Oxygen Saturation  92 


 


Arterial Blood pH  7.16 


 


Arterial Blood Partial


Pressure CO2 


  18 


 


 


Arterial Blood Partial


Pressure O2 


  98 


 


 


Arterial Blood Oxygen Content  16.0 


 


Arterial Blood


Carboxyhemoglobin 


  0.5 


 


 


Arterial Blood Methemoglobin  1.2 


 


Blood Gas Hemoglobin  12.2 


 


Oxygen Delivery Device  NASAL CANNULA 


 


Blood Gas Liter Flow  2 














 Date/Time


Source Procedure


Growth Status


 


 


 1/9/18 20:26


Blood Peripheral Aerobic Blood Culture - Preliminary


NO GROWTH IN 4 DAYS Resulted


 


 1/9/18 20:26


Blood Peripheral Anaerobic Blood Culture - Preliminary


NO GROWTH IN 4 DAYS Resulted








Result Diagram:  


1/9/18 1930                                                                    

            1/13/18 0410








Assessment and Plan


Assessment and Plan


72-year-old female with:


Altered mental status/delirium


Decompensated CHF


Nonischemic cardiomyopathy with LVEF 20%


Metabolic acidosis: Suspect low flow state causing lactic acidosis with 

possible bowel ischemia


Diarrhea


Abdominal pain


Recent lactic acidosis


Tachycardia


Bilateral pleural effusions





Plan:


Neuro: Follow neuro status.  Received Xanax earlier.  Avoid benzodiazepines, 

narcotics.


Cardiovascular: Continue milrinone gtt. on diuretics.  May require IV fluids 

for hypotension if recurs.


Pulmonary: Follow-up chest x-ray, continue supplemental O2.  Suspect tachypnea 

secondary to metabolic acidosis.


GI/liver: Follow-up LFTs.  Check stool for C. difficile in view of recent 

antibiotic use and diarrhea.  Diarrhea may possibly also be from bowel 

ischemia.  Follow-up lactic acid.  Nothing by mouth overnight and advance by 

mouth in a.m. if improved.  On PPI at home, will initiate IV Protonix.


Renal/: May be intravascularly depleted.  Continue bicarbonate drip at 50 cc 

an hour.  Received 5 amps of bicarbonate prior to critical care evaluation.  On 

Lasix twice a day.  Review chest x-ray to decide holding diuretics.  Follow-up 

repeat labs to decide further intervention


ID: Check stool for C. difficile.  Await CBC.  Hold off on antibiotics at this 

time.


Endocrine: Watch for hyperglycemia, SSI for glycemic control if needed


Heme: Follow CBC


Prophylaxis: Protonix IV, SCDs, start subcutaneous Lovenox.





Condition critical with severe metabolic acidosis.  Discussed with Dr. Chavez

, discussed with ICU RN.  Awaiting labs and chest x-ray to decide further 

management.





Time spent on critical care excluding procedures 60 minutes











Aurelio Parks MD 13, 2018 19:59

## 2018-01-13 NOTE — RADRPT
EXAM DATE/TIME:  01/13/2018 19:41 

 

[facility] COMPARISON:     

No previous studies available for comparison.

 

                     

INDICATIONS :     

Follow up CHF.

                     

 

MEDICAL HISTORY :            

Hypertension. Hernia, hiatal. Gastroesophageal reflux disease.IBS.   

 

SURGICAL HISTORY :        

Cholecystectomy. Bilateral hip replacements.

 

ENCOUNTER:     

Subsequent                                        

 

ACUITY:     

4 - 6 days      

 

PAIN SCORE:     

Non-responsive.

 

LOCATION:     

Bilateral chest 

 

FINDINGS:     

Small left and moderate right pleural effusions are again noted with bibasilar consolidation. The rig
ht pleural effusion appears slightly larger in the interim and now with some loculation near the apex
. No pneumothorax.

 

Heart size stable, mildly enlarged.

 

CONCLUSION:     

Right greater left pleural effusions are slightly worse in the interim. Mild cardiomegaly unchanged.

 

 

 

 Demian Alegria MD on January 13, 2018 at 20:04           

Board Certified Radiologist.

 This report was verified electronically.

## 2018-01-13 NOTE — HHI.PR
Subjective


Remarks


No new complaints. 


SOB and LE edema are improved from admission.





Objective


Vitals





Vital Signs








  Date Time  Temp Pulse Resp B/P (MAP) Pulse Ox O2 Delivery O2 Flow Rate FiO2


 


1/13/18 11:24     95 Room Air  


 


1/13/18 11:23  122      


 


1/13/18 11:22 98.9 122 19 122/65 (84) 95   


 


1/13/18 07:53     90 Room Air  


 


1/13/18 07:52  125      


 


1/13/18 07:46 98.9 125 17 122/64 (83) 90   


 


1/13/18 03:00 98.1 123 14 90/58 (69) 98   


 


1/13/18 03:00     98 Nasal Cannula 2.00 


 


1/13/18 03:00  123      


 


1/12/18 23:00  135      


 


1/12/18 23:00 97.8 135 14 88/60 (69) 94   


 


1/12/18 23:00     94 Room Air  


 


1/12/18 20:30  122  97/66    


 


1/12/18 19:26     96 Nasal Cannula 2.00 


 


1/12/18 19:00     95 Room Air  


 


1/12/18 19:00 98.3 140 14 91/56 (68) 95   


 


1/12/18 19:00  140      


 


1/12/18 15:00     94 Room Air  


 


1/12/18 15:00 98.1 99 16 93/67 (76) 94   


 


1/12/18 15:00  99      


 


1/12/18 13:56  114  90/54    


 


1/12/18 12:00     96 Nasal Cannula 2.00 


 


1/12/18 12:00  108      


 


1/12/18 12:00 98.0 108 16 93/67 (76) 96   








Result Diagram:  


1/9/18 1930                                                                    

            1/13/18 0410





Imaging





Last Impressions








Chest X-Ray 1/12/18 0000 Signed





Impressions: 





 Service Date/Time:  Friday, January 12, 2018 07:54 - CONCLUSION:  1. 

Persistent 





 bilateral effusions with associated airspace consolidation or atelectasis. 

Right 





 pleural effusion is larger than left and is slightly larger when compared to 

the 





 study from 3 days ago. 2. Stable enlargement of the cardiac silhouette.     

Demian Evans MD 


 


Abdomen/Pelvis CT 1/9/18 0000 Signed





Impressions: 





 Service Date/Time:  Tuesday, January 9, 2018 21:30 - CONCLUSION:  1. No 





 obstruction or acute inflammatory changes are seen in the abdomen or pelvis. 

2. 





 Apparent complex cystic and solid mass of the mid zone of the right kidney. A 





 followup MRI of the abdomen is recommended with and without contrast in 





 approximately 3 months. This may be helpful in further characterizing a small, 





 probably benign but nonspecific hypodensity of the liver. 3. Nonspecific right 





 greater than left pleural effusions and basilar atelectasis. There is an area 

of 





 nonenhancing right lower lobe pulmonary parenchyma compatible with pneumonia. 





 Infection and post obstructive process would be in the differential. After 





 treatment for presumed pneumonia, a followup CT of the chest, preferably with 





 intravenous contrast, is recommended within the next couple of months, sooner 

if 





 felt clinically indicated.  4. Also a nonspecific moderate size pericardial 





 effusion.     Demian Alegria MD 








Objective Remarks


GENERAL: This is a well-nourished, well-developed patient, in no apparent 

distress.


CARDIOVASCULAR: Regular rate and rhythm without murmurs, gallops, or rubs. 


RESPIRATORY: Clear to auscultation. Breath sounds equal bilaterally. No wheezes

, rales, or rhonchi.  


GASTROINTESTINAL: Abdomen soft, non-tender, nondistended. Normal active bowel 

sounds


MUSCULOSKELETAL: Extremities without clubbing, cyanosis, or edema.


NEURO:  Alert & Oriented x4 to person, place, time, situation.  Moves all ext x4





A/P


Problem List:  


(1) CHF exacerbation


ICD Codes:  I50.9 - Heart failure, unspecified


Status:  Acute


Plan:  


- comgmt with Cardiology


- Pt admitted with acute systolic CHF exacerbation, EF 20%


- hypotension improved today


- IV milrinone, IV lasix, IV albumen


- observe I&O's 


- 200ml urine output 7AM - noon (1/13) 


- observe weight


- telemetry: sinus tachycardia





- DVT prophylaxis


- PT 


- leg stockings


- elevate legs


- will d/w Cardiology


- consider Palliative Consult 1/15








(2) Hypotension


ICD Codes:  I95.9 - Hypotension, unspecified


Status:  Acute


Plan:  - see above





(3) Hypomagnesemia


ICD Codes:  E83.42 - Hypomagnesemia


Status:  Acute


Plan:  - replete magnesium


- repeat labs in AM








Problem Qualifiers





(1) CHF exacerbation:  


Qualified Codes:  I50.9 - Heart failure, unspecified


(2) Hypotension:  


Qualified Codes:  I95.9 - Hypotension, unspecified








Jamie Chavez DO Jan 13, 2018 11:37

## 2018-01-13 NOTE — PD.CARD.PN
Subjective


Subjective Remarks


feels SOB with positional changes, no chest pain. leg edema improving. -

130 overnight.


 (Shannon Delgado)





Objective


Medications





Current Medications








 Medications


  (Trade)  Dose


 Ordered  Sig/Sulaiman


 Route  Start Time


 Stop Time Status Last Admin


 


  (NS Flush)  2 ml  BID


 IV FLUSH  1/10/18 09:00


    1/13/18 08:56


 


 


  (NS Flush)  2 ml  UNSCH  PRN


 IV FLUSH  1/9/18 22:15


     


 


 


  (Aspirin Chew)  81 mg  DAILY


 CHEW  1/10/18 09:00


    1/13/18 08:56


 


 


  (Zofran Inj)  4 mg  Q6HR  PRN


 IV PUSH  1/9/18 22:30


    1/12/18 01:30


 


 


 Milrinone Lactate


 20 mg/Sodium


 Chloride  100 ml @ 


 8.55 mls/hr  [id number]


 IV  1/10/18 10:00


    1/12/18 20:30


 


 


  (Lasix Inj)  40 mg  BID@09,18


 IV PUSH  1/11/18 09:00


    1/13/18 08:56


 


 


 Albumin Human  100 ml @ 


 60 mls/hr  BID@0830,1730


 IV  1/11/18 08:30


    1/13/18 08:30


 


 


  (KCl Powder)  20 meq  TID


 PO  1/11/18 18:00


    1/13/18 08:57


 








Vital Signs / I&O





Vital Signs








  Date Time  Temp Pulse Resp B/P (MAP) Pulse Ox O2 Delivery O2 Flow Rate FiO2


 


1/13/18 07:53     90 Room Air  


 


1/13/18 07:52  125      


 


1/13/18 07:46 98.9 125 17 122/64 (83) 90   


 


1/13/18 03:00 98.1 123 14 90/58 (69) 98   


 


1/13/18 03:00     98 Nasal Cannula 2.00 


 


1/13/18 03:00  123      


 


1/12/18 23:00  135      


 


1/12/18 23:00 97.8 135 14 88/60 (69) 94   


 


1/12/18 23:00     94 Room Air  


 


1/12/18 20:30  122  97/66    


 


1/12/18 19:26     96 Nasal Cannula 2.00 


 


1/12/18 19:00     95 Room Air  


 


1/12/18 19:00 98.3 140 14 91/56 (68) 95   


 


1/12/18 19:00  140      


 


1/12/18 15:00     94 Room Air  


 


1/12/18 15:00 98.1 99 16 93/67 (76) 94   


 


1/12/18 15:00  99      


 


1/12/18 13:56  114  90/54    


 


1/12/18 12:00     96 Nasal Cannula 2.00 


 


1/12/18 12:00  108      


 


1/12/18 12:00 98.0 108 16 93/67 (76) 96   


 


1/12/18 10:51     98 Nasal Cannula 2.00 














I/O      


 


 1/12/18 1/12/18 1/12/18 1/13/18 1/13/18 1/13/18





 06:59 14:59 22:59 06:59 14:59 22:59


 


Intake Total 396 ml  220 ml 333.5 ml  


 


Output Total 625 ml   200 ml  


 


Balance -229 ml  220 ml 133.5 ml  


 


      


 


Intake Oral 300 ml  220 ml 240 ml  


 


IV Total 96 ml   93.5 ml  


 


Output Urine Total 625 ml   200 ml  


 


# Voids   4 1  


 


# Bowel Movements 1  3 2  








Physical Exam


GENERAL: 


SKIN: Warm and dry.


HEAD: Atraumatic. Normocephalic. 


EYES: Pupils equal and round. No scleral icterus. 


ENT: No nasal bleeding or discharge. 


NECK: Trachea midline. No JVD. 


CARDIOVASCULAR: tachycardic, regular rhythm.  


RESPIRATORY: No accessory muscle use. Clear to auscultation. decreased breath 

sounds bilateral bases


GASTROINTESTINAL: Abdomen soft, non-tender, nondistended.


MUSCULOSKELETAL: Extremities without clubbing, cyanosis. bilateral 1-2+ LE edema


NEUROLOGICAL: Awake and alert. No obvious cranial nerve deficits. Normal speech.


PSYCHIATRIC: Appropriate mood and affect; insight and judgment normal.


Laboratory





Laboratory Tests








Test


  1/13/18


04:10


 


Blood Urea Nitrogen 25 MG/DL 


 


Creatinine 0.99 MG/DL 


 


Random Glucose 116 MG/DL 


 


Calcium Level 7.9 MG/DL 


 


Magnesium Level 1.2 MG/DL 


 


Sodium Level 140 MEQ/L 


 


Potassium Level 3.7 MEQ/L 


 


Chloride Level 104 MEQ/L 


 


Carbon Dioxide Level 25.1 MEQ/L 


 


Anion Gap 11 MEQ/L 


 


Estimat Glomerular Filtration


Rate 55 ML/MIN 


 








Imaging





Last 48 hours Impressions








Chest X-Ray 1/12/18 0000 Signed





Impressions: 





 Service Date/Time:  Friday, January 12, 2018 07:54 - CONCLUSION:  1. 

Persistent 





 bilateral effusions with associated airspace consolidation or atelectasis. 

Right 





 pleural effusion is larger than left and is slightly larger when compared to 

the 





 study from 3 days ago. 2. Stable enlargement of the cardiac silhouette.     

Demian Evans MD 








 (Shannon Delgado)





Assessment and Plan


Problem List:  


(1) CHF exacerbation


ICD Codes:  I50.9 - Heart failure, unspecified


Status:  Acute


Assessment and Plan


71 yo WF with non-ischemic cardiomyopathy admitted for weakness and CHF








CHF- NICM, leg edema improving. CXR shows increasing bilateral effusions.


SOB with positional changes, -130.


remains on milrinone and IV lasix


Mg low


 (Shannon Delgado)


Assessment and Plan


wean milrinone gtt today


edema much improved


sinus tachycardia -140


continue IV lasix


watch BP.


hopefully, add entresto if BP tolerates tomorrow?


 (Shantanu Mccall MD)





Problem Qualifiers





(1) CHF exacerbation:  


Qualified Codes:  I50.9 - Heart failure, unspecified








Shannon Delgado Jan 13, 2018 09:05


Shantanu Mccall MD Jan 13, 2018 12:40

## 2018-01-14 VITALS
RESPIRATION RATE: 18 BRPM | OXYGEN SATURATION: 93 % | DIASTOLIC BLOOD PRESSURE: 47 MMHG | HEART RATE: 121 BPM | TEMPERATURE: 98.5 F | SYSTOLIC BLOOD PRESSURE: 84 MMHG

## 2018-01-14 VITALS
SYSTOLIC BLOOD PRESSURE: 82 MMHG | DIASTOLIC BLOOD PRESSURE: 57 MMHG | HEART RATE: 127 BPM | TEMPERATURE: 98.3 F | OXYGEN SATURATION: 97 % | RESPIRATION RATE: 18 BRPM

## 2018-01-14 VITALS
RESPIRATION RATE: 12 BRPM | TEMPERATURE: 97.6 F | DIASTOLIC BLOOD PRESSURE: 49 MMHG | HEART RATE: 116 BPM | OXYGEN SATURATION: 98 % | SYSTOLIC BLOOD PRESSURE: 81 MMHG

## 2018-01-14 VITALS
TEMPERATURE: 97.9 F | HEART RATE: 123 BPM | OXYGEN SATURATION: 98 % | RESPIRATION RATE: 12 BRPM | DIASTOLIC BLOOD PRESSURE: 57 MMHG | SYSTOLIC BLOOD PRESSURE: 86 MMHG

## 2018-01-14 VITALS
HEART RATE: 126 BPM | OXYGEN SATURATION: 94 % | SYSTOLIC BLOOD PRESSURE: 94 MMHG | DIASTOLIC BLOOD PRESSURE: 55 MMHG | RESPIRATION RATE: 18 BRPM | TEMPERATURE: 98.3 F

## 2018-01-14 VITALS
SYSTOLIC BLOOD PRESSURE: 82 MMHG | RESPIRATION RATE: 17 BRPM | DIASTOLIC BLOOD PRESSURE: 55 MMHG | HEART RATE: 124 BPM | TEMPERATURE: 98.6 F | OXYGEN SATURATION: 98 %

## 2018-01-14 VITALS — TEMPERATURE: 97 F

## 2018-01-14 VITALS
DIASTOLIC BLOOD PRESSURE: 52 MMHG | SYSTOLIC BLOOD PRESSURE: 88 MMHG | HEART RATE: 139 BPM | TEMPERATURE: 98.4 F | OXYGEN SATURATION: 94 % | RESPIRATION RATE: 22 BRPM

## 2018-01-14 LAB
ALBUMIN SERPL-MCNC: 3.9 GM/DL (ref 3.4–5)
ALP SERPL-CCNC: 65 U/L (ref 45–117)
ALT SERPL-CCNC: 35 U/L (ref 10–53)
AST SERPL-CCNC: 171 U/L (ref 15–37)
BASOPHILS # BLD AUTO: 0 TH/MM3 (ref 0–0.2)
BASOPHILS NFR BLD: 0.2 % (ref 0–2)
BILIRUB SERPL-MCNC: 1.7 MG/DL (ref 0.2–1)
BUN SERPL-MCNC: 29 MG/DL (ref 7–18)
CALCIUM SERPL-MCNC: 8.1 MG/DL (ref 8.5–10.1)
CHLORIDE SERPL-SCNC: 102 MEQ/L (ref 98–107)
CREAT SERPL-MCNC: 1.76 MG/DL (ref 0.5–1)
EOSINOPHIL # BLD: 0 TH/MM3 (ref 0–0.4)
EOSINOPHIL NFR BLD: 0 % (ref 0–4)
ERYTHROCYTE [DISTWIDTH] IN BLOOD BY AUTOMATED COUNT: 17.7 % (ref 11.6–17.2)
GFR SERPLBLD BASED ON 1.73 SQ M-ARVRAT: 28 ML/MIN (ref 89–?)
GLUCOSE SERPL-MCNC: 112 MG/DL (ref 74–106)
HCO3 BLD-SCNC: 22.7 MEQ/L (ref 21–32)
HCT VFR BLD CALC: 29.5 % (ref 35–46)
HGB BLD-MCNC: 9.9 GM/DL (ref 11.6–15.3)
LYMPHOCYTES # BLD AUTO: 1 TH/MM3 (ref 1–4.8)
LYMPHOCYTES NFR BLD AUTO: 11.5 % (ref 9–44)
MAGNESIUM SERPL-MCNC: 1.8 MG/DL (ref 1.5–2.5)
MCH RBC QN AUTO: 30 PG (ref 27–34)
MCHC RBC AUTO-ENTMCNC: 33.5 % (ref 32–36)
MCV RBC AUTO: 89.4 FL (ref 80–100)
MONOCYTE #: 0.5 TH/MM3 (ref 0–0.9)
MONOCYTES NFR BLD: 6.3 % (ref 0–8)
NEUTROPHILS # BLD AUTO: 7 TH/MM3 (ref 1.8–7.7)
NEUTROPHILS NFR BLD AUTO: 82 % (ref 16–70)
PHOSPHATE SERPL-MCNC: 1.8 MG/DL (ref 2.5–4.9)
PLATELET # BLD: 67 TH/MM3 (ref 150–450)
PMV BLD AUTO: 9.5 FL (ref 7–11)
PROT SERPL-MCNC: 6.1 GM/DL (ref 6.4–8.2)
RBC # BLD AUTO: 3.3 MIL/MM3 (ref 4–5.3)
SODIUM SERPL-SCNC: 143 MEQ/L (ref 136–145)
TARGETS BLD QL SMEAR: (no result)
WBC # BLD AUTO: 8.5 TH/MM3 (ref 4–11)

## 2018-01-14 RX ADMIN — LOPERAMIDE HYDROCHLORIDE PRN MG: 2 CAPSULE ORAL at 17:01

## 2018-01-14 RX ADMIN — ASPIRIN 81 MG SCH MG: 81 TABLET ORAL at 09:00

## 2018-01-14 RX ADMIN — FUROSEMIDE SCH MLS/HR: 10 INJECTION, SOLUTION INTRAMUSCULAR; INTRAVENOUS at 17:01

## 2018-01-14 RX ADMIN — ONDANSETRON PRN MG: 2 INJECTION, SOLUTION INTRAMUSCULAR; INTRAVENOUS at 19:16

## 2018-01-14 RX ADMIN — PANTOPRAZOLE SODIUM SCH MG: 40 INJECTION, POWDER, FOR SOLUTION INTRAVENOUS at 20:26

## 2018-01-14 RX ADMIN — FUROSEMIDE SCH MG: 10 INJECTION, SOLUTION INTRAMUSCULAR; INTRAVENOUS at 10:57

## 2018-01-14 RX ADMIN — DEXTROSE MONOHYDRATE SCH MLS/HR: 5 INJECTION, SOLUTION INTRAVENOUS at 05:37

## 2018-01-14 RX ADMIN — Medication SCH ML: at 09:07

## 2018-01-14 RX ADMIN — LOPERAMIDE HYDROCHLORIDE PRN MG: 2 CAPSULE ORAL at 21:49

## 2018-01-14 RX ADMIN — ALBUMIN HUMAN SCH MLS/HR: 0.25 SOLUTION INTRAVENOUS at 09:05

## 2018-01-14 RX ADMIN — POTASSIUM CHLORIDE SCH MEQ: 1.5 POWDER, FOR SOLUTION ORAL at 09:00

## 2018-01-14 RX ADMIN — FUROSEMIDE SCH MG: 10 INJECTION, SOLUTION INTRAMUSCULAR; INTRAVENOUS at 17:00

## 2018-01-14 RX ADMIN — DEXTROSE MONOHYDRATE SCH MLS/HR: 5 INJECTION, SOLUTION INTRAVENOUS at 17:11

## 2018-01-14 RX ADMIN — POTASSIUM CHLORIDE SCH MEQ: 1.5 POWDER, FOR SOLUTION ORAL at 17:02

## 2018-01-14 RX ADMIN — Medication SCH ML: at 20:27

## 2018-01-14 RX ADMIN — POTASSIUM CHLORIDE SCH MEQ: 1.5 POWDER, FOR SOLUTION ORAL at 13:00

## 2018-01-14 NOTE — RADRPT
EXAM DATE/TIME:  01/14/2018 07:29 

 

[facility] COMPARISON:     

CHEST SINGLE AP, January 13, 2018, 19:41.

 

                     

INDICATIONS :     

Shortness of breath.

                     

 

MEDICAL HISTORY :     

Congestive heart failure.       Hypertension. Hernia, hiatal. Gastroesophageal reflux disease.IBS.   


 

SURGICAL HISTORY :        

Cholecystectomy. Bilateral hip replacements.

 

ENCOUNTER:     

Subsequent                                        

 

ACUITY:     

4 - 6 days      

 

PAIN SCORE:     

0/10

 

LOCATION:     

Bilateral chest 

 

FINDINGS:     

Single AP view of the chest. Bilateral pleural effusions right greater than left unchanged. Medial ri
ght lower lobe consolidation versus atelectasis also unchanged. Skin folds noted on the right. No jessica
dence of pneumothorax. Cardiomediastinal silhouette unchanged.

 

CONCLUSION:     

No significant interval change with persistent right greater than left pleural effusions and medial r
ight lung base atelectasis versus consolidation.

 

 

 

 Skip Zepeda MD on January 14, 2018 at 8:47           

Board Certified Radiologist.

 This report was verified electronically.

## 2018-01-14 NOTE — HHI.CCPN
Subjective


Remarks/Hospital Course


Hospital Course:





72-year-old female with a medical history significant for CHF, cardiomyopathy 

who was admitted initially at [facility] from December 25 21


And subsequently discharged.  She has been very weak since her discharge and 

has had diarrhea for several months.  She has also had nausea and poor appetite 

as well as long-standing shortness of breath.  She presented back on January 9, 2018 to [facility] ER with generalized weakness as well as some shortness 

of breath.  She had significant edema in her lower extremities.  She was 

reportedly admitted with a sepsis at [facility] in December 2017 and CHF.

  Her LVEF is noted to be 20%.  During previous hospitalization she was treated 

with IV antibiotics and pressors.  She was also treated with antibiotics at 

that time.


[facility] and was 

evaluated by cardiology.  She was initiated on diuretics, IV albumin as well as 

milrinone gtt.  Initially she received Rocephin and Flagyl which was 

subsequently stopped.


Milrinone gtt. was titrated off at 12 noon on 1/13.  Patient developed 

worsening shortness of breath and some altered mental status and abdominal pain 

subsequently.  She remained on 2 L nasal cannula however was slightly 

tachypneic.  ABG was done for further evaluation around 6 PM which revealed 

severe metabolic acidosis with pH 7.16, PCO2 18, PO2 98 and bicarbonate 6. 

Critical care was consulted on 1/13 around 6:30 PM.  Dr. Mccall was contacted by 

ICU RN N ordered 5 amps of sodium bicarbonate IV push.  Patient's milrinone was 

resumed at 0.5 mics per KG per minute.  I evaluated the patient after being 

notified of the consult and ordered stat labs and chest x-ray.  When I 

evaluated the patient she was encephalopathic/lethargic with tachypnea.  She 

had just received 5 A of sodium bicarbonate IV push.  Stat labs are pending.





Subjective:





1/14: remains in distress. lactate still elevated, and although clearing, very 

slowly. delayed clearance suggestive of higher mortality rate. poor urine 

output despite increasing doses of aggressive diuresis. had long discussion 

with patient, and she states she knows her heart is failing. had discussion 

with Dr. Mccall, and we both agree patient will need left and right heart caths 

in the AM and get objective evidence of PVR, PCWP and cardiac filling and 

output. she may need dialysis in order to offload intravascular volume from 

right heart failure.





Objective





Vital Signs








  Date Time  Temp Pulse Resp B/P (MAP) Pulse Ox O2 Delivery O2 Flow Rate FiO2


 


1/14/18 10:15 97.0       


 


1/14/18 07:00  139      


 


1/14/18 07:00   22 88/52 (64) 94   


 


1/14/18 07:00      Nasal Cannula 4.50 


 


1/13/18 07:55        21














Intake and Output   


 


 1/14/18 1/14/18 1/15/18





 08:00 16:00 00:00


 


Intake Total 1214 ml  


 


Output Total 23 ml  


 


Balance 1191 ml  








Result Diagram:  


1/14/18 0355                                                                   

             1/14/18 0355





Other Results





Laboratory Tests








Test


  1/13/18


18:00 1/13/18


19:53 1/14/18


04:29


 


Blood Gas Puncture Site LT RADIAL  LT RADIAL  LT RADIAL 


 


Blood Gas Patient Temperature 98.6  98.6  98.6 


 


Blood Gas HCO3


  6 mmol/L


(22-26) 13 mmol/L


(22-26) 22 mmol/L


(22-26)


 


Blood Gas Base Excess


  -20.8 mmol/L


(-2-2) -11.2 mmol/L


(-2-2) -0.7 mmol/L


(-2-2)


 


Blood Gas Oxygen Saturation 92 % ()  93 % ()  84 % () 


 


Arterial Blood pH


  7.16


(7.380-7.420) 7.40


(7.380-7.420) 7.49


(7.380-7.420)


 


Arterial Blood Partial


Pressure CO2 18 mmHg


(38-42) 21 mmHg


(38-42) 29 mmHg


(38-42)


 


Arterial Blood Partial


Pressure O2 98 mmHg


() 83 mmHg


() 51 mmHg


()


 


Arterial Blood Oxygen Content


  16.0 Vol %


(12.0-20.0) 15.4 Vol %


(12.0-20.0) 11.6 Vol %


(12.0-20.0)


 


Arterial Blood


Carboxyhemoglobin 0.5 % (0-4) 


  1.1 % (0-4) 


  1.4 % (0-4) 


 


 


Arterial Blood Methemoglobin 1.2 % (0-2)  1.0 % (0-2)  1.2 % (0-2) 


 


Blood Gas Hemoglobin


  12.2 G/DL


(12.0-16.0) 11.7 G/DL


(12.0-16.0) 9.8 G/DL


(12.0-16.0)


 


Oxygen Delivery Device NASAL CANNULA  SM  NASAL CANNULA 


 


Blood Gas Liter Flow 2 L/M  5 L/M  2 L/M 








Objective Remarks


HEENT/Neuro: No pallor or icterus, tongue moist, LOUISA, awake and alert.


Neck: JVD above the level of the mandible.


Chest/pulmonary: tachypneic. labored. on nc o2 but visibly distressed.


Cardiovascular: Tachycardic with heart rate 140s, sinus by tele.


GI/abdomen: Soft, mildly tender to palpation diffusely. no guarding


Extremities: Warm bilaterally, bilateral edema





A/P


Assessment and Plan


Assessment: 72yF with biventricular failure, severe, worsening. EF 20% and now 

clinically in cardiogenic shock and RV failure, Cor pulmonale, requiring 

inotropes. uop is poor. she would benefit from left and right heart cath in 

cath lab and targeted inotropic therapy and diuresis. will add lasix drip for 

more aggressive diuresis. unfortunately, with such severe CHF, she would not do 

very well with renal replacement therapy given age and associated organ 

failures. If she does not improve with high dose inotropes and diuretics, may 

need to consider palliative care. remains critically ill in shock and actively 

worsening biventricular failure despite maximal therapy.





Active problems:


Severe biventricular failure


Cor pulmonale


cardiogenic shock


acute hypoxic respiratory failure


pulmonary edema


Congestive hepatopathy


Acute kidney injury secondary to cardiogenic shock


severe pulmonary hypertension


Nonischemic cardiomyopathy with LVEF 20%


Metabolic acidosis: Suspect low flow state causing lactic acidosis with 

probable bowel ischemia


Diarrhea


Abdominal pain


Tachycardia


Bilateral pleural effusions





Plan:


lasix drip


add metolazone. diuril was unsuccessful


milrinone at 0.5 mcg/kg/hr


digoxin 0.25mg iv x 1.


serial LFTs, BMP, CBC


trend lactates


watch uop


requires herrera


wean o2 by nc for goal spo2 > 92%


have discussed with Dr. Mccall and will make NPO at [facility] for possible left and 

right heart caths tomorrow


Lomotil for diarrhea: likely this is from ischemia, but patient says it gives 

her symptomatic relief, and she specifically requests it.





This patient remains critically ill with one or more organ systems which are or 

may become a threat to life.  I have spent in excess of 48 minutes 

discontinuously in the care and management of this patient.  This time is 

exclusive of procedures, and includes, but is not limited to, evaluation of the 

patient, review of the medical record, discussions with family, consultants, 

nursing staff, or respiratory therapy, and documentation in the medical record.











David Kruse MD Jan 14, 2018 11:28

## 2018-01-14 NOTE — PD.CARD.PN
Subjective


Subjective Remarks


decompensated last night. metabolic acidosis, milrinone resumed with improved 

minimally improved SBP


 (Shannon Delgado)





Objective


Medications





Current Medications








 Medications


  (Trade)  Dose


 Ordered  Sig/Sulaiman


 Route  Start Time


 Stop Time Status Last Admin


 


  (NS Flush)  2 ml  BID


 IV FLUSH  1/10/18 09:00


    1/14/18 09:07


 


 


  (NS Flush)  2 ml  UNSCH  PRN


 IV FLUSH  1/9/18 22:15


     


 


 


  (Aspirin Chew)  81 mg  DAILY


 CHEW  1/10/18 09:00


    1/13/18 08:56


 


 


  (Zofran Inj)  4 mg  Q6HR  PRN


 IV PUSH  1/9/18 22:30


    1/13/18 16:13


 


 


  (Lasix Inj)  40 mg  BID@09,18


 IV PUSH  1/11/18 09:00


    1/13/18 16:53


 


 


 Albumin Human  100 ml @ 


 60 mls/hr  BID@0830,1730


 IV  1/11/18 08:30


    1/14/18 09:05


 


 


  (KCl Powder)  20 meq  TID


 PO  1/11/18 18:00


    1/13/18 18:00


 


 


 Milrinone Lactate


 20 mg/Sodium


 Chloride  100 ml @ 


 8.62 mls/hr  [id number]


 IV  1/13/18 18:00


    1/14/18 05:37


 


 


  (Protonix Inj)  40 mg  Q24H


 IV PUSH  1/13/18 20:00


    1/13/18 20:40


 


 


  (Lovenox Inj)  40 mg  Q24H


 SQ  1/13/18 20:00


    1/13/18 20:41


 


 


 Sodium


 Bicarbonate 150


 meq/Sterile Water  1,000 ml @ 


 50 mls/hr  Q20H


 IV  1/14/18 01:01


    1/14/18 01:01


 








Vital Signs / I&O





Vital Signs








  Date Time  Temp Pulse Resp B/P (MAP) Pulse Ox O2 Delivery O2 Flow Rate FiO2


 


1/14/18 07:00  139      


 


1/14/18 07:00 98.4 139 22 88/52 (64) 94   


 


1/14/18 07:00     94 Nasal Cannula 4.50 


 


1/14/18 05:37  120  83/50    


 


1/14/18 03:00 98.5 126 18 84/47 (59) 93   


 


1/14/18 03:00  121      


 


1/14/18 03:00     93 Nasal Cannula 2.00 


 


1/14/18 00:00     97 Simple Mask 10.00 


 


1/14/18 00:00  127      


 


1/14/18 00:00 98.3 126 18 82/57 (65) 97   


 


1/13/18 20:00     90 Nasal Cannula 3.00 


 


1/13/18 20:00 98.0 138 27 117/75 (89) 90   


 


1/13/18 20:00  141      


 


1/13/18 19:27     94 Nasal Cannula 2.00 


 


1/13/18 18:00  130  110/77    


 


1/13/18 17:11  140  82/30    


 


1/13/18 15:13     95 Room Air  


 


1/13/18 15:12  131      


 


1/13/18 15:11 98.0 131 19 120/66 (84) 95   


 


1/13/18 11:24     95 Room Air  


 


1/13/18 11:23  122      


 


1/13/18 11:22 98.9 122 19 122/65 (84) 95   














I/O      


 


 1/13/18 1/13/18 1/13/18 1/14/18 1/14/18 1/14/18





 07:00 15:00 23:00 07:00 15:00 23:00


 


Intake Total 333.5 ml 280 ml 600 ml 1374 ml  


 


Output Total 200 ml  400 ml 23 ml  


 


Balance 133.5 ml 280 ml 200 ml 1351 ml  


 


      


 


Intake Oral 240 ml  500 ml 240 ml  


 


IV Total 93.5 ml 280 ml 100 ml 1134 ml  


 


Output Urine Total 200 ml  400 ml 23 ml  


 


# Voids 1     


 


# Bowel Movements 2  4 1  








Physical Exam


GENERAL: 


SKIN: Warm and dry.


HEAD: Atraumatic. Normocephalic. 


EYES: Pupils equal and round. No scleral icterus. 


ENT: No nasal bleeding or discharge. 


NECK: Trachea midline. No JVD. 


CARDIOVASCULAR: tachycardic, regular rhythm.  


RESPIRATORY: No accessory muscle use. Clear to auscultation. decreased breath 

sounds bilateral bases


GASTROINTESTINAL: Abdomen soft, non-tender, nondistended.


MUSCULOSKELETAL: Extremities without clubbing, cyanosis. bilateral 1-2+ LE edema


NEUROLOGICAL: Awake and alert. No obvious cranial nerve deficits. Normal speech.


PSYCHIATRIC: sedated, arousable


Laboratory





Laboratory Tests








Test


  1/13/18


18:00 1/13/18


19:53 1/13/18


20:18 1/13/18


20:30


 


Blood Gas Puncture Site LT RADIAL  LT RADIAL   


 


Blood Gas Patient Temperature 98.6  98.6   


 


Blood Gas HCO3 6 mmol/L  13 mmol/L   


 


Blood Gas Base Excess -20.8 mmol/L  -11.2 mmol/L   


 


Blood Gas Oxygen Saturation 92 %  93 %   


 


Arterial Blood pH 7.16  7.40   


 


Arterial Blood Partial


Pressure CO2 18 mmHg 


  21 mmHg 


  


  


 


 


Arterial Blood Partial


Pressure O2 98 mmHg 


  83 mmHg 


  


  


 


 


Arterial Blood Oxygen Content 16.0 Vol %  15.4 Vol %   


 


Arterial Blood


Carboxyhemoglobin 0.5 % 


  1.1 % 


  


  


 


 


Arterial Blood Methemoglobin 1.2 %  1.0 %   


 


Blood Gas Hemoglobin 12.2 G/DL  11.7 G/DL   


 


Oxygen Delivery Device NASAL CANNULA  SM   


 


Blood Gas Liter Flow 2 L/M  5 L/M   


 


Blood Urea Nitrogen   28 MG/DL  


 


Creatinine   1.61 MG/DL  


 


Random Glucose   76 MG/DL  


 


Total Protein   7.5 GM/DL  


 


Albumin   4.7 GM/DL  


 


Calcium Level   8.8 MG/DL  


 


Phosphorus Level   2.5 MG/DL  


 


Magnesium Level   2.2 MG/DL  


 


Alkaline Phosphatase   90 U/L  


 


Aspartate Amino Transf


(AST/SGOT) 


  


  130 U/L 


  


 


 


Alanine Aminotransferase


(ALT/SGPT) 


  


  25 U/L 


  


 


 


Total Bilirubin   3.3 MG/DL  


 


Sodium Level   140 MEQ/L  


 


Potassium Level   5.7 MEQ/L  


 


Chloride Level   101 MEQ/L  


 


Carbon Dioxide Level   12.0 MEQ/L  


 


Anion Gap   27 MEQ/L  


 


Estimat Glomerular Filtration


Rate 


  


  32 ML/MIN 


  


 


 


White Blood Count    10.1 TH/MM3 


 


Red Blood Count    4.11 MIL/MM3 


 


Hemoglobin    12.3 GM/DL 


 


Hematocrit    37.8 % 


 


Mean Corpuscular Volume    91.8 FL 


 


Mean Corpuscular Hemoglobin    29.9 PG 


 


Mean Corpuscular Hemoglobin


Concent 


  


  


  32.6 % 


 


 


Red Cell Distribution Width    18.0 % 


 


Platelet Count    84 TH/MM3 


 


Mean Platelet Volume    9.7 FL 


 


Neutrophils (%) (Auto)    83.6 % 


 


Lymphocytes (%) (Auto)    9.4 % 


 


Monocytes (%) (Auto)    6.8 % 


 


Eosinophils (%) (Auto)    0.0 % 


 


Basophils (%) (Auto)    0.2 % 


 


Neutrophils # (Auto)    8.5 TH/MM3 


 


Lymphocytes # (Auto)    0.9 TH/MM3 


 


Monocytes # (Auto)    0.7 TH/MM3 


 


Eosinophils # (Auto)    0.0 TH/MM3 


 


Basophils # (Auto)    0.0 TH/MM3 


 


CBC Comment    AUTO DIFF 


 


Differential Comment


  


  


  


  AUTO DIFF


CONFIRMED


 


Prothrombin Time    19.5 SEC 


 


Prothromb Time International


Ratio 


  


  


  1.9 RATIO 


 


 


Activated Partial


Thromboplast Time 


  


  


  38.3 SEC 


 


 


Lactic Acid Level    17.6 mmol/L 


 


B-Type Natriuretic Peptide


  


  


  


  GREATER THAN


5000 PG/ML


 


Test


  1/14/18


02:45 1/14/18


03:55 1/14/18


04:29 


 


 


Stool C. difficile Toxin (PCR) NEGATIVE    


 


Stl C. difficile Toxin


Epiderm 027 PRESUMPTIVE


NEGATIVE 


  


  


 


 


White Blood Count  8.5 TH/MM3   


 


Red Blood Count  3.30 MIL/MM3   


 


Hemoglobin  9.9 GM/DL   


 


Hematocrit  29.5 %   


 


Mean Corpuscular Volume  89.4 FL   


 


Mean Corpuscular Hemoglobin  30.0 PG   


 


Mean Corpuscular Hemoglobin


Concent 


  33.5 % 


  


  


 


 


Red Cell Distribution Width  17.7 %   


 


Platelet Count  67 TH/MM3   


 


Mean Platelet Volume  9.5 FL   


 


Neutrophils (%) (Auto)  82.0 %   


 


Lymphocytes (%) (Auto)  11.5 %   


 


Monocytes (%) (Auto)  6.3 %   


 


Eosinophils (%) (Auto)  0.0 %   


 


Basophils (%) (Auto)  0.2 %   


 


Neutrophils # (Auto)  7.0 TH/MM3   


 


Lymphocytes # (Auto)  1.0 TH/MM3   


 


Monocytes # (Auto)  0.5 TH/MM3   


 


Eosinophils # (Auto)  0.0 TH/MM3   


 


Basophils # (Auto)  0.0 TH/MM3   


 


CBC Comment  AUTO DIFF   


 


Differential Comment


  


  AUTO DIFF


CONFIRMED 


  


 


 


Platelet Estimate  LOW   


 


Platelet Morphology Comment  NORMAL   


 


Target Cells  1+   


 


Blood Urea Nitrogen  29 MG/DL   


 


Creatinine  1.76 MG/DL   


 


Random Glucose  112 MG/DL   


 


Total Protein  6.1 GM/DL   


 


Albumin  3.9 GM/DL   


 


Calcium Level  8.1 MG/DL   


 


Phosphorus Level  1.8 MG/DL   


 


Magnesium Level  1.8 MG/DL   


 


Alkaline Phosphatase  65 U/L   


 


Aspartate Amino Transf


(AST/SGOT) 


  171 U/L 


  


  


 


 


Alanine Aminotransferase


(ALT/SGPT) 


  35 U/L 


  


  


 


 


Total Bilirubin  1.7 MG/DL   


 


Sodium Level  143 MEQ/L   


 


Potassium Level  4.7 MEQ/L   


 


Chloride Level  102 MEQ/L   


 


Carbon Dioxide Level  22.7 MEQ/L   


 


Anion Gap  18 MEQ/L   


 


Estimat Glomerular Filtration


Rate 


  28 ML/MIN 


  


  


 


 


Lactic Acid Level  11.8 mmol/L   


 


B-Type Natriuretic Peptide


  


  GREATER THAN


5000 PG/ML 


  


 


 


Blood Gas Puncture Site   LT RADIAL  


 


Blood Gas Patient Temperature   98.6  


 


Blood Gas HCO3   22 mmol/L  


 


Blood Gas Base Excess   -0.7 mmol/L  


 


Blood Gas Oxygen Saturation   84 %  


 


Arterial Blood pH   7.49  


 


Arterial Blood Partial


Pressure CO2 


  


  29 mmHg 


  


 


 


Arterial Blood Partial


Pressure O2 


  


  51 mmHg 


  


 


 


Arterial Blood Oxygen Content   11.6 Vol %  


 


Arterial Blood


Carboxyhemoglobin 


  


  1.4 % 


  


 


 


Arterial Blood Methemoglobin   1.2 %  


 


Blood Gas Hemoglobin   9.8 G/DL  


 


Oxygen Delivery Device   NASAL CANNULA  


 


Blood Gas Liter Flow   2 L/M  








Imaging





Last 48 hours Impressions








Chest X-Ray 1/14/18 0800 Signed





Impressions: 





 Service Date/Time:  Sunday, January 14, 2018 07:29 - CONCLUSION:  No 

significant 





 interval change with persistent right greater than left pleural effusions and 





 medial right lung base atelectasis versus consolidation.     Skip Zepeda MD 


 


Chest X-Ray 1/13/18 0000 Signed





Impressions: 





 Service Date/Time:  Saturday, January 13, 2018 19:41 - CONCLUSION:  Right 





 greater left pleural effusions are slightly worse in the interim. Mild 





 cardiomegaly unchanged.     Demian Alegria MD 








Last 24 hours Impressions








Chest X-Ray 1/14/18 0800 Signed





Impressions: 





 Service Date/Time:  Sunday, January 14, 2018 07:29 - CONCLUSION:  No 

significant 





 interval change with persistent right greater than left pleural effusions and 





 medial right lung base atelectasis versus consolidation.     Skip Zepeda MD 








 (Shannon Delgado)





Assessment and Plan


Problem List:  


(1) CHF exacerbation


ICD Codes:  I50.9 - Heart failure, unspecified


Status:  Acute


Assessment and Plan


73 yo WF with non-ischemic cardiomyopathy admitted for weakness and CHF





CHF- decompensated with metabolic acidosis overnight. 


milrinone resumed, SBP currently 90, consider Entresto when hypotension improves


IV lasix


 (Shannon Delgado)


Assessment and Plan


-----------------------


decompensated acute on chronic systolic and diastolic CHF


NYHA class IV


decompensated quickly off milrinone gtt yesterday with severe metabolic 

acidosis and elevated lactate, worsening renal function, hepatic congestion.  

All consistent with reduced cardiac output and low flow state with 

underperfusion.


start low dose digoxin for oral positive inotropic action


low urine output.  


JVD elevated to mandible.  appears still volume overloaded.  Lasix IV.  monitor 

Cr closely.


if no improvement today, low threshold for RHC to determine filling pressures


transplant candidate ?


Dr. Niño to return tomorrow.


 (Shantanu Mccall MD)





Problem Qualifiers





(1) CHF exacerbation:  


Qualified Codes:  I50.9 - Heart failure, unspecified








Shannon Delgado Jan 14, 2018 09:27


Shantanu Mccall MD Jan 14, 2018 10:57

## 2018-01-15 VITALS
DIASTOLIC BLOOD PRESSURE: 58 MMHG | RESPIRATION RATE: 16 BRPM | TEMPERATURE: 98.2 F | SYSTOLIC BLOOD PRESSURE: 86 MMHG | OXYGEN SATURATION: 94 % | HEART RATE: 121 BPM

## 2018-01-15 VITALS
DIASTOLIC BLOOD PRESSURE: 53 MMHG | TEMPERATURE: 98.3 F | OXYGEN SATURATION: 94 % | HEART RATE: 117 BPM | RESPIRATION RATE: 15 BRPM | SYSTOLIC BLOOD PRESSURE: 90 MMHG

## 2018-01-15 VITALS
RESPIRATION RATE: 15 BRPM | OXYGEN SATURATION: 97 % | DIASTOLIC BLOOD PRESSURE: 55 MMHG | TEMPERATURE: 98.1 F | SYSTOLIC BLOOD PRESSURE: 87 MMHG | HEART RATE: 124 BPM

## 2018-01-15 VITALS
RESPIRATION RATE: 12 BRPM | OXYGEN SATURATION: 96 % | TEMPERATURE: 98.2 F | HEART RATE: 113 BPM | SYSTOLIC BLOOD PRESSURE: 103 MMHG | DIASTOLIC BLOOD PRESSURE: 58 MMHG

## 2018-01-15 VITALS
OXYGEN SATURATION: 98 % | HEART RATE: 124 BPM | SYSTOLIC BLOOD PRESSURE: 101 MMHG | TEMPERATURE: 98.3 F | DIASTOLIC BLOOD PRESSURE: 58 MMHG | RESPIRATION RATE: 12 BRPM

## 2018-01-15 VITALS
RESPIRATION RATE: 11 BRPM | SYSTOLIC BLOOD PRESSURE: 103 MMHG | DIASTOLIC BLOOD PRESSURE: 55 MMHG | OXYGEN SATURATION: 97 % | HEART RATE: 113 BPM | TEMPERATURE: 97.6 F

## 2018-01-15 VITALS — OXYGEN SATURATION: 97 %

## 2018-01-15 LAB
ALBUMIN SERPL-MCNC: 4.1 GM/DL (ref 3.4–5)
ALP SERPL-CCNC: 64 U/L (ref 45–117)
ALT SERPL-CCNC: 32 U/L (ref 10–53)
AST SERPL-CCNC: 93 U/L (ref 15–37)
BILIRUB SERPL-MCNC: 1.7 MG/DL (ref 0.2–1)
BUN SERPL-MCNC: 31 MG/DL (ref 7–18)
CALCIUM SERPL-MCNC: 8.6 MG/DL (ref 8.5–10.1)
CHLORIDE SERPL-SCNC: 97 MEQ/L (ref 98–107)
CREAT SERPL-MCNC: 1.67 MG/DL (ref 0.5–1)
DIGOXIN SERPL-MCNC: 1.6 NG/ML (ref 0.8–2)
ERYTHROCYTE [DISTWIDTH] IN BLOOD BY AUTOMATED COUNT: 17.9 % (ref 11.6–17.2)
GFR SERPLBLD BASED ON 1.73 SQ M-ARVRAT: 30 ML/MIN (ref 89–?)
GLUCOSE SERPL-MCNC: 85 MG/DL (ref 74–106)
HCO3 BLD-SCNC: 30 MEQ/L (ref 21–32)
HCT VFR BLD CALC: 31 % (ref 35–46)
HGB BLD-MCNC: 10.5 GM/DL (ref 11.6–15.3)
INR PPP: 1.6 RATIO
MCH RBC QN AUTO: 30.7 PG (ref 27–34)
MCHC RBC AUTO-ENTMCNC: 33.9 % (ref 32–36)
MCV RBC AUTO: 90.7 FL (ref 80–100)
PHOSPHATE SERPL-MCNC: 1.5 MG/DL (ref 2.5–4.9)
PLATELET # BLD: 52 TH/MM3 (ref 150–450)
PMV BLD AUTO: 10.1 FL (ref 7–11)
PROT SERPL-MCNC: 6.3 GM/DL (ref 6.4–8.2)
PROTHROMBIN TIME: 16.1 SEC (ref 9.8–11.6)
RBC # BLD AUTO: 3.41 MIL/MM3 (ref 4–5.3)
SODIUM SERPL-SCNC: 141 MEQ/L (ref 136–145)
WBC # BLD AUTO: 7.2 TH/MM3 (ref 4–11)

## 2018-01-15 RX ADMIN — DEXTROSE MONOHYDRATE SCH MLS/HR: 5 INJECTION, SOLUTION INTRAVENOUS at 16:28

## 2018-01-15 RX ADMIN — FUROSEMIDE SCH MLS/HR: 10 INJECTION, SOLUTION INTRAMUSCULAR; INTRAVENOUS at 08:20

## 2018-01-15 RX ADMIN — POTASSIUM CHLORIDE SCH MEQ: 1.5 POWDER, FOR SOLUTION ORAL at 17:58

## 2018-01-15 RX ADMIN — ASPIRIN 81 MG SCH MG: 81 TABLET ORAL at 09:00

## 2018-01-15 RX ADMIN — PANTOPRAZOLE SODIUM SCH MG: 40 INJECTION, POWDER, FOR SOLUTION INTRAVENOUS at 19:44

## 2018-01-15 RX ADMIN — LOPERAMIDE HYDROCHLORIDE PRN MG: 2 CAPSULE ORAL at 22:18

## 2018-01-15 RX ADMIN — FUROSEMIDE SCH MLS/HR: 10 INJECTION, SOLUTION INTRAMUSCULAR; INTRAVENOUS at 03:08

## 2018-01-15 RX ADMIN — LOPERAMIDE HYDROCHLORIDE PRN MG: 2 CAPSULE ORAL at 15:05

## 2018-01-15 RX ADMIN — POTASSIUM CHLORIDE SCH MEQ: 1.5 POWDER, FOR SOLUTION ORAL at 13:00

## 2018-01-15 RX ADMIN — POTASSIUM CHLORIDE SCH MEQ: 1.5 POWDER, FOR SOLUTION ORAL at 09:00

## 2018-01-15 RX ADMIN — ONDANSETRON PRN MG: 2 INJECTION, SOLUTION INTRAMUSCULAR; INTRAVENOUS at 15:04

## 2018-01-15 RX ADMIN — Medication SCH ML: at 21:00

## 2018-01-15 RX ADMIN — DEXTROSE MONOHYDRATE SCH MLS/HR: 5 INJECTION, SOLUTION INTRAVENOUS at 06:21

## 2018-01-15 RX ADMIN — FUROSEMIDE SCH MLS/HR: 10 INJECTION, SOLUTION INTRAMUSCULAR; INTRAVENOUS at 01:08

## 2018-01-15 RX ADMIN — Medication SCH ML: at 09:59

## 2018-01-15 NOTE — PD.CONS
Consult


Service


Palliative Care


Consult Requested By


Dr. Kruse


.


Primary Care Physician


Fito Martin M.D.


 .


Reason for Consultation


   a.  To assist with evaluation and management of symptoms including:  Dyspnea


   b.  To assist medical decision maker(s) with: better understanding of 

current medical conditions; weighing benefits/burdens of medical treatment 

options; making        


        medical treatment decisions.


.





HPI


History of Present Illness


This 72-year-old female, with a past history of CHF, hypertension, and 

degenerative arthritis, was admitted to [facility] with her initial 

presentation of CHF in 2017.  She was readmitted there 3 or 4 weeks ago 

because of another exacerbation, but was able to return home.  She reports that 

her leg edema continued to worsen, that she felt weaker, and that she became 

more and more dyspneic at home, finally returning to the emergency department 

here on 18.  She reports that her recent ejection fraction was determined 

to be 20%, and because of hypotension she was intolerant to ace inhibitors.  

She had not been having chest pain.  In the emergency department, findings 

included:


* Weakness, some dyspnea


* Temp 97.4, pulse 92, respirations 16, blood pressure 93/66, oxygen saturation 

95% on 2 L


* White count 7.1, hemoglobin 13.4


* Sodium 134, creatinine 1.4, albumin 2.8


* AST 39, ALT 17


* BNP 3089


* Chest x-ray with bilateral effusions right greater than left


* CT abdomen/pelvis revealed a 23 mm cyst with possible mildly enhancing soft 

tissue at its inferior margin in the right kidney





The patient  was admitted to the cardiovascular ICU.  She developed worsening 

effusions on chest x-ray, worsening dyspnea, altered mental status, and an 

acidosis with pH 7.16 on 17.  Amps of bicarbonate were given.  The patient'

s urine output was poor on 18, and her blood pressure was in the 80s 

systolic.  Her creatinine was higher on the day of this consultation, with 

creatinine 1.67; in addition, AST was 93, ALT 32, and bilirubin 1.7.  The 

patient had some colitis that was suspected to be due to her low flow state, 

and the hepatic congestion was also attributed to that.





A continuous infusion of Lasix did produce more diuresis, and the patient's 

dyspnea was improved on the day of this consultation.  At the time of this 

consultation, the patient is being considered for possible right and left heart 

caths; the patient reports that she has never had a cardiac catheterization.





Palliative Care was consulted to assist with symptom management, and to enter 

into discussions with the patient and family regarding her current illnesses, 

prognosis, and the benefits and burdens of the various treatment choices.


.


Function/Cognitive Trajectory


The patient has been living and functioning independently, but in the couple 

days prior to this hospitalization she had weakness and pronounced edema that 

had become too severe for her to ambulate.


.





Review of Systems


Constitutional:  COMPLAINS OF: Weight gain (attributed to edema)


Endocrine:  DENIES: Polyuria


Eyes:  DENIES: Eye inflammation


Ears, nose, mouth, throat:  DENIES: Epistaxis


Respiratory:  COMPLAINS OF: Shortness of breath, DENIES: Apneas, Cough, Wheezing

, Hemoptysis


Cardiovascular:  COMPLAINS OF: Dyspnea on Exertion, Lower Extremity Edema, 

DENIES: Chest pain, Palpitations, Syncope


Gastrointestinal:  COMPLAINS OF: Diarrhea (attributed to colitis), DENIES: 

Bloody stools, Constipation, Vomiting, Vomiting blood


Genitourinary:  DENIES: Hematuria


Musculoskeletal:  DENIES: Back pain, Neck pain


Integumentary:  DENIES: Rash


Hematologic/Lymphatics:  DENIES: Lymphadenopathy


Immunologic/Allergic:  DENIES: Urticaria


Neurologic:  COMPLAINS OF: Paresthesias (legs, attributed to peripheral 

neuropathy), DENIES: Headache, Localized weakness


Psychiatric:  COMPLAINS OF: Confusion (after admission, but cleared), DENIES: 

Depression, Hallucinations





Past Family Social History


Coded Allergies:  


     codeine (Unverified  Allergy, Severe, ITCH, 8/15/17)


     penicillin G (Unverified  Allergy, Severe, RASH, 8/15/17)


     vancomycin (Unverified  Allergy, Severe, HIVES, EDEMA/SWELLING, 8/15/17)


Uncoded Allergies:  


     ANGIOTENSIN RECEPTOR BLOCKER (Adverse Reaction, Severe, Cough, 17)


Past Medical History


* Congestive heart failure, initially diagnosed 2017


* History of hypertension


* \GERD


* Degenerative arthritis


* History of colitis


* History of hyperthyroidism


* Peripheral neuropathy


* History of B12 deficiency


* Avascular necrosis left hip 


.


Past Surgical History


* Cholecystectomy


* Ventral herniorrhaphy 


* Left hip replacement, 2004


* Right hip replacement, 2004


* Cataracts, 2017 and 2017


.


Reported Medications





Reported Meds & Active Scripts


Active


Reported


Diphenoxylate-Atropine 2.5-0.025 Mg Tab 1 Tab PO Q6H PRN


Folic Acid 0.4 Mg Tab 1 Mg PO DAILY


Vitamin D3 (Cholecalciferol) 1,000 Unit Cap 1,000 Units PO DAILY


Furosemide 40 Mg Tab 40 Mg PO DAILY


Pantoprazole (Pantoprazole Sodium) 40 Mg Tab 40 Mg PO DAILY


K-Tab (Potassium Chloride) 10 Meq Tab 10 Meq PO DAILY


.





Current Medications








 Medications


  (Trade)  Dose


 Ordered  Sig/Sulaiman


 Route  Start Time


 Stop Time Status Last Admin


 


  (NS Flush)  2 ml  BID


 IV FLUSH  1/10/18 09:00


    1/15/18 09:59


 


 


  (NS Flush)  2 ml  UNSCH  PRN


 IV FLUSH  18 22:15


     


 


 


  (Aspirin Chew)  81 mg  DAILY


 CHEW  1/10/18 09:00


    18 08:56


 


 


  (Zofran Inj)  4 mg  Q6HR  PRN


 IV PUSH  18 22:30


    18 19:16


 


 


 Albumin Human  100 ml @ 


 60 mls/hr  BID@0830,1730


 IV  18 08:30


   Future Hold 18 09:05


 


 


  (KCl Powder)  20 meq  TID


 PO  18 18:00


    1/15/18 09:00


 


 


 Milrinone Lactate


 20 mg/Sodium


 Chloride  100 ml @ 


 8.62 mls/hr  [id number]


 IV  18 18:00


    1/15/18 06:21


 


 


  (Protonix Inj)  40 mg  Q24H


 IV PUSH  18 20:00


    18 20:26


 


 


  (Lovenox Inj)  30 mg  Q24H


 SQ  18 20:00


   Future Hold 18 20:26


 


 


  (Imodium)  2 mg  Q4H  PRN


 PO  18 16:30


    18 21:49


 


 


  (Diamox Inj)  500 mg  Q8H


 IV PUSH  1/15/18 09:00


 18 01:01  1/15/18 09:59


 


 


 Potassium Chloride  100 ml @ 


 50 mls/hr  Q2H  PRN


 IV  1/15/18 09:00


     


 


 


 Potassium Chloride  100 ml @ 


 50 mls/hr  Q2H  PRN


 IV  1/15/18 09:00


     


 


 


  (K-Lyte Cl  Eff)  50 meq  UNSCH  PRN


 PO  1/15/18 09:00


     


 


 


 Potassium Chloride  100 ml @ 


 25 mls/hr  UNSCH  PRN


 IV  1/15/18 09:00


     


 


 


 Potassium Chloride  100 ml @ 


 50 mls/hr  Q2H  PRN


 IV  1/15/18 09:00


     


 


 


 Magnesium Sulfate


 4 gm/Sodium


 Chloride  100 ml @ 


 50 mls/hr  UNSCH  PRN


 IV  1/15/18 09:00


     


 


 


  (Mag-Ox)  800 mg  UNSCH  PRN


 PO  1/15/18 09:00


     


 


 


 Magnesium Sulfate


 2 gm/Sodium


 Chloride  100 ml @ 


 50 mls/hr  UNSCH  PRN


 IV  1/15/18 09:00


     


 


 


  (K-Phos)  2,000 mg  Q4H  PRN


 PO  1/15/18 09:00


    1/15/18 09:57


 


 


 Sodium Phosphate


 30 mmol/Sodium


 Chloride  250 ml @ 


 42 mls/hr  UNSCH  PRN


 IV  1/15/18 09:00


     


 


 


  (K-Phos)  2,000 mg  UNSCH  PRN


 PO/TUBE  1/15/18 09:00


     


 


 


 Potassium


 Phosphate 30 mmol/


 Sodium Chloride  260 ml @ 


 42 mls/hr  UNSCH  PRN


 IV  1/15/18 09:00


     


 








Family History


The patient's mother  at age 84 after CABG and pacemaker.  Her father  

of leukemia at a younger age.  The patient has no siblings, and she knows of no 

other relative with significant cardiac disease.


.


Substance Use


Tobacco: None


Alcohol: Occasional, social


Prescription med abuse: None


Illicits: None


.


Psychosocial History


The patient was born and raised in [place], [place].  She went to [employer] where she earned a bachelor's degree in English/political science, 

and a master's degree in high school counseling.





She was  once,  in 2012 of apparent sudden cardiac death.





She has 3 sons, all of whom live locally.  There are 3 grandchildren.





The patient worked as a , and eventually as the principal of 

[employer] for several years, retiring in .


.


Spiritual/Cultural Factors


Spirituality and her kimberly has been very important to her.  She has a [employer] 

background, but has been [employer] for many years, currently being supported 

by her .


.


Living Will:  Copy in medical record


Health Care Surrogate:  Copy in medical record


Durable Power of :  Never completed


Health Care Surrogate(s):


Primary healthcare surrogate his son Angel, secondary is son Kenyon


.


Documented care wishes:


The patient has a living will


.


Today's verbally stated goals:


There has been discussion about possible placement of an AICD, and the patient 

would want that.  She is willing to undergo heart cath to see if that may also 

help in effective treatment.  If her kidneys were to fail, she would want 

additional discussion prior to considering dialysis.  If she suffers cardiac 

arrest and is on a ventilator, she reports she would not want to be kept on 

life support "very long if I really wasn't going to get much better."  She is 

open to hospice services if her disease processes or complications seen to be 

progressing or if additional complications confirm that she is end-stage.


.


Family/friends goals:


The patient's daughter-in-law is present for the initial consultation and 

supports the patient's wishes.


.


Ethical and Legal Issues


There are no ethical issues that would impact her care or decision-making at 

this time.





The patient has capacity for decision-making.  She has designated sons Angel 

and Kenyon as primary and secondary HCS respectively.


.





Physical Exam





Vital Signs








  Date Time  Temp Pulse Resp B/P (MAP) Pulse Ox O2 Delivery O2 Flow Rate FiO2


 


1/15/18 09:39     97 Nasal Cannula 2.00 


 


1/15/18 07:00 98.1 118 15 87/55 (66) 97   


 


1/15/18 07:00  124      


 


1/15/18 07:00     97 Nasal Cannula 2.00 


 


1/15/18 06:21  120  101/66    


 


1/15/18 06:00  120  101/66    


 


1/15/18 03:00     97 Nasal Cannula 2.00 


 


1/15/18 03:00 97.6 113 11 103/55 (71) 97   


 


1/15/18 03:00  113      


 


18 23:00 97.6 116 12 81/49 (60) 98   


 


18 23:00     98 Nasal Cannula 3.00 


 


18 23:00  124      


 


18 19:00     98 Nasal Cannula 4.00 





      Humidified  


 


18 19:00 97.9 123 12 86/57 (67) 98   


 


18 19:00  123      


 


18 17:11  127  85/54    


 


18 16:58  112  85/54    


 


18 15:00 98.3 126 18 94/55 (68) 94   


 


18 15:00  126      


 


18 15:00     97 Nasal Cannula 4.50 








Exam


CONSTITUTIONAL/GENERAL: This is a weak patient, in no apparent distress.


TUBES/LINES/DRAINS: Nasal O2, IV access, Mckeon


SKIN: No jaundice, rashes, or lesions. Ecchymoses on upper extremities. No 

wounds seen anteriorly. Skin temperature appropriate. Not diaphoretic. 


HEAD: Atraumatic. Normocephalic.


EYES: Pupils equal and round and reactive. Extraocular motions intact. No 

scleral icterus. No injection or drainage. Fundi not examined.


ENT: Hearing grossly normal. Nose without bleeding or purulent drainage. Throat 

without visible erythema, exudates, masses, or lesions.


NECK: Trachea midline. Supple, nontender. No palpable thyroid enlargement or 

nodularity. 


CARDIOVASCULAR: Regular rate and rhythm with grade 1 systolic murmur.  At 30% 

incline, some JVD. Peripheral pulses diminished in feet.


RESPIRATORY/CHEST: Symmetric, unlabored respirations. Breath sounds equal 

bilaterally.  Some scattered rales


GASTROINTESTINAL: Abdomen soft, non-tender, nondistended. No hepato-splenomegaly

, or palpable masses. No guarding. Bowel sounds present.


GENITOURINARY: Without palpable bladder distension.  Mckeon catheter is present


MUSCULOSKELETAL: Extremities without clubbing, cyanosis, or edema. No joint 

tenderness or effusion noted. No calf tenderness. No mottling or clubbing.


LYMPHATICS: No palpable cervical or supraclavicular adenopathy.


NEUROLOGICAL: Awake and alert. Motor and sensory grossly within normal limits. 

Follows commands. Cognitively sharp. Moves all extremities.


PSYCHIATRIC: No obvious anxiety/depression. no apparent hallucinations or other 

psychotic thought process.


.





Diagnostic Tests


Laboratory





Laboratory Tests








Test


  18


04:10 18


18:00 18


19:53 18


20:18


 


Blood Urea Nitrogen


  25 MG/DL


(7-18) 


  


  28 MG/DL


(7-18)


 


Creatinine


  0.99 MG/DL


(0.50-1.00) 


  


  1.61 MG/DL


(0.50-1.00)


 


Random Glucose


  116 MG/DL


() 


  


  76 MG/DL


()


 


Calcium Level


  7.9 MG/DL


(8.5-10.1) 


  


  8.8 MG/DL


(8.5-10.1)


 


Magnesium Level


  1.2 MG/DL


(1.5-2.5) 


  


  2.2 MG/DL


(1.5-2.5)


 


Sodium Level


  140 MEQ/L


(136-145) 


  


  140 MEQ/L


(136-145)


 


Potassium Level


  3.7 MEQ/L


(3.5-5.1) 


  


  5.7 MEQ/L


(3.5-5.1)


 


Chloride Level


  104 MEQ/L


() 


  


  101 MEQ/L


()


 


Carbon Dioxide Level


  25.1 MEQ/L


(21.0-32.0) 


  


  12.0 MEQ/L


(21.0-32.0)


 


Anion Gap


  11 MEQ/L


(5-15) 


  


  27 MEQ/L


(5-15)


 


Estimat Glomerular Filtration


Rate 55 ML/MIN


(>89) 


  


  32 ML/MIN


(>89)


 


Blood Gas Puncture Site  LT RADIAL  LT RADIAL  


 


Blood Gas Patient Temperature  98.6  98.6  


 


Blood Gas HCO3


  


  6 mmol/L


(22-26) 13 mmol/L


(22-26) 


 


 


Blood Gas Base Excess


  


  -20.8 mmol/L


(-2-2) -11.2 mmol/L


(-2-2) 


 


 


Blood Gas Oxygen Saturation  92 % ()  93 % ()  


 


Arterial Blood pH


  


  7.16


(7.380-7.420) 7.40


(7.380-7.420) 


 


 


Arterial Blood Partial


Pressure CO2 


  18 mmHg


(38-42) 21 mmHg


(38-42) 


 


 


Arterial Blood Partial


Pressure O2 


  98 mmHg


() 83 mmHg


() 


 


 


Arterial Blood Oxygen Content


  


  16.0 Vol %


(12.0-20.0) 15.4 Vol %


(12.0-20.0) 


 


 


Arterial Blood


Carboxyhemoglobin 


  0.5 % (0-4) 


  1.1 % (0-4) 


  


 


 


Arterial Blood Methemoglobin  1.2 % (0-2)  1.0 % (0-2)  


 


Blood Gas Hemoglobin


  


  12.2 G/DL


(12.0-16.0) 11.7 G/DL


(12.0-16.0) 


 


 


Oxygen Delivery Device  NASAL CANNULA  SM  


 


Blood Gas Liter Flow  2 L/M  5 L/M  


 


Total Protein


  


  


  


  7.5 GM/DL


(6.4-8.2)


 


Albumin


  


  


  


  4.7 GM/DL


(3.4-5.0)


 


Phosphorus Level


  


  


  


  2.5 MG/DL


(2.5-4.9)


 


Alkaline Phosphatase


  


  


  


  90 U/L


()


 


Aspartate Amino Transf


(AST/SGOT) 


  


  


  130 U/L


(15-37)


 


Alanine Aminotransferase


(ALT/SGPT) 


  


  


  25 U/L (10-53) 


 


 


Total Bilirubin


  


  


  


  3.3 MG/DL


(0.2-1.0)


 


Test


  18


20:30 18


02:45 18


03:55 18


04:29


 


White Blood Count


  10.1 TH/MM3


(4.0-11.0) 


  8.5 TH/MM3


(4.0-11.0) 


 


 


Red Blood Count


  4.11 MIL/MM3


(4.00-5.30) 


  3.30 MIL/MM3


(4.00-5.30) 


 


 


Hemoglobin


  12.3 GM/DL


(11.6-15.3) 


  9.9 GM/DL


(11.6-15.3) 


 


 


Hematocrit


  37.8 %


(35.0-46.0) 


  29.5 %


(35.0-46.0) 


 


 


Mean Corpuscular Volume


  91.8 FL


(80.0-100.0) 


  89.4 FL


(80.0-100.0) 


 


 


Mean Corpuscular Hemoglobin


  29.9 PG


(27.0-34.0) 


  30.0 PG


(27.0-34.0) 


 


 


Mean Corpuscular Hemoglobin


Concent 32.6 %


(32.0-36.0) 


  33.5 %


(32.0-36.0) 


 


 


Red Cell Distribution Width


  18.0 %


(11.6-17.2) 


  17.7 %


(11.6-17.2) 


 


 


Platelet Count


  84 TH/MM3


(150-450) 


  67 TH/MM3


(150-450) 


 


 


Mean Platelet Volume


  9.7 FL


(7.0-11.0) 


  9.5 FL


(7.0-11.0) 


 


 


Neutrophils (%) (Auto)


  83.6 %


(16.0-70.0) 


  82.0 %


(16.0-70.0) 


 


 


Lymphocytes (%) (Auto)


  9.4 %


(9.0-44.0) 


  11.5 %


(9.0-44.0) 


 


 


Monocytes (%) (Auto)


  6.8 %


(0.0-8.0) 


  6.3 %


(0.0-8.0) 


 


 


Eosinophils (%) (Auto)


  0.0 %


(0.0-4.0) 


  0.0 %


(0.0-4.0) 


 


 


Basophils (%) (Auto)


  0.2 %


(0.0-2.0) 


  0.2 %


(0.0-2.0) 


 


 


Neutrophils # (Auto)


  8.5 TH/MM3


(1.8-7.7) 


  7.0 TH/MM3


(1.8-7.7) 


 


 


Lymphocytes # (Auto)


  0.9 TH/MM3


(1.0-4.8) 


  1.0 TH/MM3


(1.0-4.8) 


 


 


Monocytes # (Auto)


  0.7 TH/MM3


(0-0.9) 


  0.5 TH/MM3


(0-0.9) 


 


 


Eosinophils # (Auto)


  0.0 TH/MM3


(0-0.4) 


  0.0 TH/MM3


(0-0.4) 


 


 


Basophils # (Auto)


  0.0 TH/MM3


(0-0.2) 


  0.0 TH/MM3


(0-0.2) 


 


 


CBC Comment AUTO DIFF   AUTO DIFF  


 


Differential Comment


  AUTO DIFF


CONFIRMED 


  AUTO DIFF


CONFIRMED 


 


 


Prothrombin Time


  19.5 SEC


(9.8-11.6) 


  


  


 


 


Prothromb Time International


Ratio 1.9 RATIO 


  


  


  


 


 


Activated Partial


Thromboplast Time 38.3 SEC


(24.3-30.1) 


  


  


 


 


Lactic Acid Level


  17.6 mmol/L


(0.4-2.0) 


  11.8 mmol/L


(0.4-2.0) 


 


 


B-Type Natriuretic Peptide


  GREATER THAN


5000 PG/ML 


  GREATER THAN


5000 PG/ML 


 


 


Stool C. difficile Toxin (PCR)


  


  NEGATIVE


(NEGATIVE) 


  


 


 


Stl C. difficile Toxin


Epiderm 027 


  PRESUMPTIVE


NEGATIVE 


  


 


 


Platelet Estimate   LOW (NORMAL)  


 


Platelet Morphology Comment


  


  


  NORMAL


(NORMAL) 


 


 


Target Cells   1+ (NORMAL)  


 


Blood Urea Nitrogen


  


  


  29 MG/DL


(7-18) 


 


 


Creatinine


  


  


  1.76 MG/DL


(0.50-1.00) 


 


 


Random Glucose


  


  


  112 MG/DL


() 


 


 


Total Protein


  


  


  6.1 GM/DL


(6.4-8.2) 


 


 


Albumin


  


  


  3.9 GM/DL


(3.4-5.0) 


 


 


Calcium Level


  


  


  8.1 MG/DL


(8.5-10.1) 


 


 


Phosphorus Level


  


  


  1.8 MG/DL


(2.5-4.9) 


 


 


Magnesium Level


  


  


  1.8 MG/DL


(1.5-2.5) 


 


 


Alkaline Phosphatase


  


  


  65 U/L


() 


 


 


Aspartate Amino Transf


(AST/SGOT) 


  


  171 U/L


(15-37) 


 


 


Alanine Aminotransferase


(ALT/SGPT) 


  


  35 U/L (10-53) 


  


 


 


Total Bilirubin


  


  


  1.7 MG/DL


(0.2-1.0) 


 


 


Sodium Level


  


  


  143 MEQ/L


(136-145) 


 


 


Potassium Level


  


  


  4.7 MEQ/L


(3.5-5.1) 


 


 


Chloride Level


  


  


  102 MEQ/L


() 


 


 


Carbon Dioxide Level


  


  


  22.7 MEQ/L


(21.0-32.0) 


 


 


Anion Gap


  


  


  18 MEQ/L


(5-15) 


 


 


Estimat Glomerular Filtration


Rate 


  


  28 ML/MIN


(>89) 


 


 


Blood Gas Puncture Site    LT RADIAL 


 


Blood Gas Patient Temperature    98.6 


 


Blood Gas HCO3


  


  


  


  22 mmol/L


(22-26)


 


Blood Gas Base Excess


  


  


  


  -0.7 mmol/L


(-2-2)


 


Blood Gas Oxygen Saturation    84 % () 


 


Arterial Blood pH


  


  


  


  7.49


(7.380-7.420)


 


Arterial Blood Partial


Pressure CO2 


  


  


  29 mmHg


(38-42)


 


Arterial Blood Partial


Pressure O2 


  


  


  51 mmHg


()


 


Arterial Blood Oxygen Content


  


  


  


  11.6 Vol %


(12.0-20.0)


 


Arterial Blood


Carboxyhemoglobin 


  


  


  1.4 % (0-4) 


 


 


Arterial Blood Methemoglobin    1.2 % (0-2) 


 


Blood Gas Hemoglobin


  


  


  


  9.8 G/DL


(12.0-16.0)


 


Oxygen Delivery Device    NASAL CANNULA 


 


Blood Gas Liter Flow    2 L/M 


 


Test


  1/15/18


04:23 1/15/18


09:52 


  


 


 


White Blood Count


  7.2 TH/MM3


(4.0-11.0) 


  


  


 


 


Red Blood Count


  3.41 MIL/MM3


(4.00-5.30) 


  


  


 


 


Hemoglobin


  10.5 GM/DL


(11.6-15.3) 


  


  


 


 


Hematocrit


  31.0 %


(35.0-46.0) 


  


  


 


 


Mean Corpuscular Volume


  90.7 FL


(80.0-100.0) 


  


  


 


 


Mean Corpuscular Hemoglobin


  30.7 PG


(27.0-34.0) 


  


  


 


 


Mean Corpuscular Hemoglobin


Concent 33.9 %


(32.0-36.0) 


  


  


 


 


Red Cell Distribution Width


  17.9 %


(11.6-17.2) 


  


  


 


 


Platelet Count


  52 TH/MM3


(150-450) 


  


  


 


 


Mean Platelet Volume


  10.1 FL


(7.0-11.0) 


  


  


 


 


Prothrombin Time


  16.1 SEC


(9.8-11.6) 


  


  


 


 


Prothromb Time International


Ratio 1.6 RATIO 


  


  


  


 


 


Activated Partial


Thromboplast Time 46.4 SEC


(24.3-30.1) 


  


  


 


 


Blood Urea Nitrogen


  31 MG/DL


(7-18) 


  


  


 


 


Creatinine


  1.67 MG/DL


(0.50-1.00) 


  


  


 


 


Random Glucose


  85 MG/DL


() 


  


  


 


 


Total Protein


  6.3 GM/DL


(6.4-8.2) 


  


  


 


 


Albumin


  4.1 GM/DL


(3.4-5.0) 


  


  


 


 


Calcium Level


  8.6 MG/DL


(8.5-10.1) 


  


  


 


 


Phosphorus Level


  1.5 MG/DL


(2.5-4.9) 


  


  


 


 


Alkaline Phosphatase


  64 U/L


() 


  


  


 


 


Aspartate Amino Transf


(AST/SGOT) 93 U/L (15-37) 


  


  


  


 


 


Alanine Aminotransferase


(ALT/SGPT) 32 U/L (10-53) 


  


  


  


 


 


Total Bilirubin


  1.7 MG/DL


(0.2-1.0) 


  


  


 


 


Sodium Level


  141 MEQ/L


(136-145) 


  


  


 


 


Potassium Level


  3.6 MEQ/L


(3.5-5.1) 


  


  


 


 


Chloride Level


  97 MEQ/L


() 


  


  


 


 


Carbon Dioxide Level


  30.0 MEQ/L


(21.0-32.0) 


  


  


 


 


Anion Gap


  14 MEQ/L


(5-15) 


  


  


 


 


Estimat Glomerular Filtration


Rate 30 ML/MIN


(>89) 


  


  


 


 


Lactic Acid Level


  3.5 mmol/L


(0.4-2.0) 


  


  


 


 


Magnesium Level


  1.7 MG/DL


(1.5-2.5) 


  


  


 


 


Digoxin Level


  1.6 NG/ML


(0.8-2.0) 


  


  


 








Result Diagram:  


1/15/18 0423                                                                   

             1/15/18 0423





Imaging





Last Impressions








Chest X-Ray 18 0800 Signed





Impressions: 





 Service Date/Time:  2018 07:29 - CONCLUSION:  No 

significant 





 interval change with persistent right greater than left pleural effusions and 





 medial right lung base atelectasis versus consolidation.     Skip Zepeda MD 


 


Abdomen/Pelvis CT 18 0000 Signed





Impressions: 





 Service Date/Time:  2018 21:30 - CONCLUSION:  1. No 





 obstruction or acute inflammatory changes are seen in the abdomen or pelvis. 

2. 





 Apparent complex cystic and solid mass of the mid zone of the right kidney. A 





 followup MRI of the abdomen is recommended with and without contrast in 





 approximately 3 months. This may be helpful in further characterizing a small, 





 probably benign but nonspecific hypodensity of the liver. 3. Nonspecific right 





 greater than left pleural effusions and basilar atelectasis. There is an area 

of 





 nonenhancing right lower lobe pulmonary parenchyma compatible with pneumonia. 





 Infection and post obstructive process would be in the differential. After 





 treatment for presumed pneumonia, a followup CT of the chest, preferably with 





 intravenous contrast, is recommended within the next couple of months, sooner 

if 





 felt clinically indicated.  4. Also a nonspecific moderate size pericardial 





 effusion.     Demian Alegria MD 











Patient/Family Conference


Present at Family Conference:


The patient's daughter-in-law Nabila was present for the last half of the 

discussion.


.


Family Conference Time (mins):  55


Family Conference Location:  Bedside


Issues Discussed:


* Palliative care role, purpose, approach


* Hospice care role, purpose, approach


* Additional medical, psychosocial, and spiritual history


* Patients general health, functional status, and cognitive changes in the 

months leading up to the current hospitalization


* Patient/family understanding of the current medical problems


* Patient/family understanding of prognosis


* Patients goals of care as best understood from advance directives and/or 

conversations and/or values


* Current medical treatment options and benefits/burdens of those options


* Likely scenarios comparing ongoing aggressive care with a transition to 

comfort measures only


* Questions answered to the best of my ability


* Palliative care contact information provided


There has been discussion about possible placement of an AICD, and the patient 

would want that.  She is willing to undergo heart cath to see if that may also 

help in effective treatment.  If her kidneys were to fail, she would want 

additional discussion prior to considering dialysis.  If she suffers cardiac 

arrest and is on a ventilator, she reports she would not want to be kept on 

life support "very long if I really wasn't going to get much better."  She is 

open to hospice services if her disease seems to be progressing or if 

additional complications confirm that she is end-stage.


.





Assessment and Plan


Disease Oriented Problem List:  


(1) cardiomyopathy


(2) congestive failure with pronounced right side failure symptoms


(3) low flow state, with some hepatic, renal, and GI effects


(4) CHF, first diagnosed 2017


(5) right kidney cyst on CT scan, possibly complex


(6) colitis, possible ischemic


(7) GERD


(8) history of hypertension


(9) history of hyperthyroid


(10) peripheral neuropathy


(11) history of B12 deficiency


(12) history of avascular necrosis left hip 


Symptom Scale:  


(1) dyspnea


0-10 Scale:  1





(2) weakness


0-10 Scale:  6





Pertinent Non-Medical Issues


Psychosocial:  , 3 sons living locally, retired principal of [employer]


Spiritual:  Spirituality and her kimberly has been very important to her.  She has 

a [employer] background, but has been [employer] for many years, currently being 

supported by her .


Legal:  The patient has capacity for decision-making.  She has designated her 

sons Angel and Kenyon as primary and secondary HCS respectively.


Ethical issues impacting care: None


.


Important Contacts


Son: Angel Garcia [phone]


Son: Kenyon Garcia [phone]


.


Prognosis


Overall, her prognosis is quite guarded.  She has significant cardiomyopathy 

with low flow state.  She will be appropriate for hospice services if the 

upcoming studies confirm end-stage disease, and if the goals become solely 

comfort oriented.


.


Code Status:  Full Code


Plan


* FULL CODE


* DECISION-MAKING:  The patient has capacity for decision-making.  She has 

designated her sons Angel and Kenyon as primary and secondary HCS 

respectively.


* GOALS:  There has been discussion about possible placement of an AICD, and 

the patient would want that.  She is willing to undergo heart cath to see if 

that may also help in effective treatment.  If her kidneys were to fail, she 

would want additional discussion prior to considering dialysis.  If she suffers 

cardiac arrest and is on a ventilator, she reports she would not want to be 

kept on life support "very long if I really wasn't going to get much better."  

She is open to hospice services if her disease processes or complications seen 

to be progressing or if additional complications confirm that she is end-stage.


* SYMPTOMS: The patient's dyspnea has improved over the past days as the urine 

output has increased and the edema has subsided.  She has no pain.  I have no 

additional medication recommendations at this time.


* It is likely she will have additional investigative procedures, and further 

discussions regarding prognosis and goals will be undertaken as that 

information becomes more clear.


* Palliative Care will continue to follow the patient during this 

hospitalization.


.





Time Spent


Total Floor Time (mins):  79


Face to Face Time (mins):  62


>50% Counseling/Coord of Care:  Yes (d/w Dr. Niño)





Thank you for the opportunity to participate in the care of Ms. Garcia.





Attestation


To help prompt me to consider important information that might be impacting 

today's encounter and assessment, information from prior notes written by 

myself or my colleagues may have been "brought forward" into today's note.  My 

signature on this note, however, is an attestation that I personally performed 

the exam, history, and/or decision-making noted today, and, unless otherwise 

indicated, the interactions with patient, family, and staff as well as the 

review of records all occurred today.  I also attest that the listed assessment 

and stated plan reflect my best clinical judgment today based on the 

combination of historical information, prior notes, and today's exam/ 

interactions.  When time spent is documented, it refers only to time spent 

today by the signer, or if indicated, combined time spent today by 

collaborating physician/nurse practitioner.











Nina Garcia MD Raymond 15, 2018 12:13

## 2018-01-15 NOTE — HHI.CCPN
Subjective


Remarks/Hospital Course


Hospital Course:





72-year-old female with a medical history significant for CHF, cardiomyopathy 

who was admitted initially at [facility] from December 25 21


And subsequently discharged.  She has been very weak since her discharge and 

has had diarrhea for several months.  She has also had nausea and poor appetite 

as well as long-standing shortness of breath.  She presented back on January 9, 2018 to [facility] ER with generalized weakness as well as some shortness 

of breath.  She had significant edema in her lower extremities.  She was 

reportedly admitted with a sepsis at [facility] in December 2017 and CHF.

  Her LVEF is noted to be 20%.  During previous hospitalization she was treated 

with IV antibiotics and pressors.  She was also treated with antibiotics at 

that time.


Patient was admitted by [facility] on January 9 and was 

evaluated by cardiology.  She was initiated on diuretics, IV albumin as well as 

milrinone gtt.  Initially she received Rocephin and Flagyl which was 

subsequently stopped.


Milrinone gtt. was titrated off at 12 noon on 1/13.  Patient developed 

worsening shortness of breath and some altered mental status and abdominal pain 

subsequently.  She remained on 2 L nasal cannula however was slightly 

tachypneic.  ABG was done for further evaluation around 6 PM which revealed 

severe metabolic acidosis with pH 7.16, PCO2 18, PO2 98 and bicarbonate 6. 

Critical care was consulted on 1/13 around 6:30 PM.  Dr. Mccall was contacted by 

ICU RN N ordered 5 amps of sodium bicarbonate IV push.  Patient's milrinone was 

resumed at 0.5 mics per KG per minute.  I evaluated the patient after being 

notified of the consult and ordered stat labs and chest x-ray.  When I 

evaluated the patient she was encephalopathic/lethargic with tachypnea.  She 

had just received 5 A of sodium bicarbonate IV push.  Stat labs are pending.





Subjective:





1/14: remains in distress. lactate still elevated, and although clearing, very 

slowly. delayed clearance suggestive of higher mortality rate. poor urine 

output despite increasing doses of aggressive diuresis. had long discussion 

with patient, and she states she knows her heart is failing. had discussion 

with Dr. Mccall, and we both agree patient will need left and right heart caths 

in the AM and get objective evidence of PVR, PCWP and cardiac filling and 

output. she may need dialysis in order to offload intravascular volume from 

right heart failure.





1/15: uop improved from overnight, now 50-60cc/hr. remains on lasix drip. NPO 

for possible LHC/RHC. Cr remains elevated, but stable: likely YOLIE from ATN from 

poor perfusion 48h ago. patient subjectively feels improved and complaining of 

less fatigue and sob.





Objective





Vital Signs








  Date Time  Temp Pulse Resp B/P (MAP) Pulse Ox O2 Delivery O2 Flow Rate FiO2


 


1/15/18 07:00 98.1 118 15 87/55 (66) 97   


 


1/15/18 07:00      Nasal Cannula 2.00 


 


1/13/18 07:55        21














Intake and Output   


 


 1/15/18 1/15/18 1/16/18





 08:00 16:00 00:00


 


Intake Total 485 ml  


 


Output Total 550 ml  


 


Balance -65 ml  








Result Diagram:  


1/15/18 0423                                                                   

             1/15/18 0423





Objective Remarks


HEENT/Neuro: No pallor or icterus, tongue moist, LOUISA, awake and alert.


Neck: JVD above the level of the mandible.


Chest/pulmonary: unlabored. remains on nc o2.


Cardiovascular: Tachycardic with heart rate 110s, sinus by tele.


GI/abdomen: Soft, mildly tender to palpation diffusely. no guarding


Extremities: Warm bilaterally, bilateral edema





A/P


Assessment and Plan


Assessment: 72yF with biventricular failure, severe. was in cardiogenic shock 

when milrinone was weaned. now with persistently elevated lactate, although 

improving. and remains on milrinone therapy. still clinically appears volume 

overloaded, and likely more RV failure with cor pulmonale. improved uop with 

maximal diuretic therapy. Agree that LHC and RHC are indicated and will be 

useful in determining if this is worsening ventricular function and failure vs. 

volume overload and over-distended starling curve needing more aggressive 

diuresis. 





Active problems:


Severe biventricular failure


Cor pulmonale


cardiogenic shock- some improvements but persistent.


acute hypoxic respiratory failure - improving


pulmonary edema - slightly improved


Congestive hepatopathy - improving.


Acute kidney injury secondary to cardiogenic shock


severe pulmonary hypertension


Nonischemic cardiomyopathy with LVEF 20%


Metabolic acidosis: Suspect low flow state causing lactic acidosis with 

probable bowel ischemia


Diarrhea - improving.


Abdominal pain


Tachycardia


Bilateral pleural effusions





Plan:


continue forced diuresis


possible LHC/RHC to eval filling pressures and cardiac output.


continue metolazone


diamox 500mg iv q8h


milrinone at 0.5 mcg/kg/hr


serial LFTs, BMP, CBC


trend lactates


watch uop


requires herrera


wean o2 by nc for goal spo2 > 92%


Dr. Niño following.


Lomotil for diarrhea: likely this is from ischemia, but patient says it gives 

her symptomatic relief, and she specifically requests it.





Remain in ICU. highly complex. off pathway.











David Kruse MD Raymond 15, 2018 08:57

## 2018-01-15 NOTE — PD.CARD.PN
Subjective


Subjective Remarks


Feeling much better today.  Feels like her breathing is at baseline, but she 

has not tried any activity.  Denies any chest pain or palpitations.  Lower 

extremity edema is still present but improving.





Objective


Medications





Current Medications








 Medications


  (Trade)  Dose


 Ordered  Sig/Sulaiman


 Route  Start Time


 Stop Time Status Last Admin


 


  (NS Flush)  2 ml  BID


 IV FLUSH  1/10/18 09:00


    1/14/18 20:27


 


 


  (NS Flush)  2 ml  UNSCH  PRN


 IV FLUSH  1/9/18 22:15


     


 


 


  (Aspirin Chew)  81 mg  DAILY


 CHEW  1/10/18 09:00


    1/13/18 08:56


 


 


  (Zofran Inj)  4 mg  Q6HR  PRN


 IV PUSH  1/9/18 22:30


    1/14/18 19:16


 


 


 Albumin Human  100 ml @ 


 60 mls/hr  BID@0830,1730


 IV  1/11/18 08:30


   Future Hold 1/14/18 09:05


 


 


  (KCl Powder)  20 meq  TID


 PO  1/11/18 18:00


    1/14/18 17:02


 


 


 Milrinone Lactate


 20 mg/Sodium


 Chloride  100 ml @ 


 8.62 mls/hr  [id number]


 IV  1/13/18 18:00


    1/15/18 06:21


 


 


  (Protonix Inj)  40 mg  Q24H


 IV PUSH  1/13/18 20:00


    1/14/18 20:26


 


 


  (Lovenox Inj)  30 mg  Q24H


 SQ  1/14/18 20:00


   Future Hold 1/14/18 20:26


 


 


 Furosemide 100 mg/


 Sodium Chloride  100 ml @ 


 20 mls/hr  CONTINUOUS


 IV  1/14/18 16:30


    1/15/18 08:20


 


 


  (Imodium)  2 mg  Q4H  PRN


 PO  1/14/18 16:30


    1/14/18 21:49


 


 


  (Diamox Inj)  500 mg  Q8H


 IV PUSH  1/15/18 09:00


 1/16/18 01:01   


 


 


 Potassium Chloride  100 ml @ 


 50 mls/hr  Q2H  PRN


 IV  1/15/18 09:00


     


 


 


 Potassium Chloride  100 ml @ 


 50 mls/hr  Q2H  PRN


 IV  1/15/18 09:00


     


 


 


  (K-Lyte Cl  Eff)  50 meq  UNSCH  PRN


 PO  1/15/18 09:00


     


 


 


 Potassium Chloride  100 ml @ 


 25 mls/hr  UNSCH  PRN


 IV  1/15/18 09:00


     


 


 


 Potassium Chloride  100 ml @ 


 50 mls/hr  Q2H  PRN


 IV  1/15/18 09:00


     


 


 


 Magnesium Sulfate


 4 gm/Sodium


 Chloride  100 ml @ 


 50 mls/hr  UNSCH  PRN


 IV  1/15/18 09:00


     


 


 


  (Mag-Ox)  800 mg  UNSCH  PRN


 PO  1/15/18 09:00


     


 


 


 Magnesium Sulfate


 2 gm/Sodium


 Chloride  100 ml @ 


 50 mls/hr  UNSCH  PRN


 IV  1/15/18 09:00


     


 


 


  (K-Phos)  2,000 mg  Q4H  PRN


 PO  1/15/18 09:00


     


 


 


 Sodium Phosphate


 30 mmol/Sodium


 Chloride  250 ml @ 


 42 mls/hr  UNSCH  PRN


 IV  1/15/18 09:00


     


 


 


  (K-Phos)  2,000 mg  UNSCH  PRN


 PO/TUBE  1/15/18 09:00


     


 


 


 Potassium


 Phosphate 30 mmol/


 Sodium Chloride  260 ml @ 


 42 mls/hr  UNSCH  PRN


 IV  1/15/18 09:00


     


 








Vital Signs / I&O





Vital Signs








  Date Time  Temp Pulse Resp B/P (MAP) Pulse Ox O2 Delivery O2 Flow Rate FiO2


 


1/15/18 07:00 98.1 118 15 87/55 (66) 97   


 


1/15/18 07:00  124      


 


1/15/18 07:00     97 Nasal Cannula 2.00 


 


1/15/18 06:21  120  101/66    


 


1/15/18 06:00  120  101/66    


 


1/15/18 03:00     97 Nasal Cannula 2.00 


 


1/15/18 03:00 97.6 113 11 103/55 (71) 97   


 


1/15/18 03:00  113      


 


1/14/18 23:00 97.6 116 12 81/49 (60) 98   


 


1/14/18 23:00     98 Nasal Cannula 3.00 


 


1/14/18 23:00  124      


 


1/14/18 19:00     98 Nasal Cannula 4.00 





      Humidified  


 


1/14/18 19:00 97.9 123 12 86/57 (67) 98   


 


1/14/18 19:00  123      


 


1/14/18 17:11  127  85/54    


 


1/14/18 16:58  112  85/54    


 


1/14/18 15:00 98.3 126 18 94/55 (68) 94   


 


1/14/18 15:00  126      


 


1/14/18 15:00     97 Nasal Cannula 4.50 


 


1/14/18 11:00 98.6 124 17 82/55 (64) 98   


 


1/14/18 11:00     97 Nasal Cannula 4.50 


 


1/14/18 11:00  124      


 


1/14/18 10:15 97.0       














I/O      


 


 1/14/18 1/14/18 1/14/18 1/15/18 1/15/18 1/15/18





 07:00 15:00 23:00 07:00 15:00 23:00


 


Intake Total 1374 ml 253 ml 613 ml 485 ml  


 


Output Total 23 ml  200 ml 550 ml  


 


Balance 1351 ml 253 ml 413 ml -65 ml  


 


      


 


Intake Oral 240 ml  500 ml 240 ml  


 


IV Total 1134 ml 253 ml 113 ml 245 ml  


 


Output Urine Total 23 ml  200 ml 550 ml  


 


# Bowel Movements 1  5 1  








Physical Exam


GENERAL: Well-developed well-nourished.  In no acute distress.


NECK: No carotid bruits.  No JVD.  


CARDIOVASCULAR: Tachycardic rate with early beats.  No murmur appreciated.


RESPIRATORY: No accessory muscle use. Clear to auscultation. Breath sounds 

equal bilaterally. 


MUSCULOSKELETAL: No clubbing or cyanosis.  Trace edema. 


NEUROLOGICAL: Awake and alert. Normal speech.


Laboratory





Laboratory Tests








Test


  1/15/18


04:23


 


White Blood Count 7.2 TH/MM3 


 


Red Blood Count 3.41 MIL/MM3 


 


Hemoglobin 10.5 GM/DL 


 


Hematocrit 31.0 % 


 


Mean Corpuscular Volume 90.7 FL 


 


Mean Corpuscular Hemoglobin 30.7 PG 


 


Mean Corpuscular Hemoglobin


Concent 33.9 % 


 


 


Red Cell Distribution Width 17.9 % 


 


Platelet Count 52 TH/MM3 


 


Mean Platelet Volume 10.1 FL 


 


Prothrombin Time 16.1 SEC 


 


Prothromb Time International


Ratio 1.6 RATIO 


 


 


Activated Partial


Thromboplast Time 46.4 SEC 


 


 


Blood Urea Nitrogen 31 MG/DL 


 


Creatinine 1.67 MG/DL 


 


Random Glucose 85 MG/DL 


 


Total Protein 6.3 GM/DL 


 


Albumin 4.1 GM/DL 


 


Calcium Level 8.6 MG/DL 


 


Phosphorus Level 1.5 MG/DL 


 


Alkaline Phosphatase 64 U/L 


 


Aspartate Amino Transf


(AST/SGOT) 93 U/L 


 


 


Alanine Aminotransferase


(ALT/SGPT) 32 U/L 


 


 


Total Bilirubin 1.7 MG/DL 


 


Sodium Level 141 MEQ/L 


 


Potassium Level 3.6 MEQ/L 


 


Chloride Level 97 MEQ/L 


 


Carbon Dioxide Level 30.0 MEQ/L 


 


Anion Gap 14 MEQ/L 


 


Estimat Glomerular Filtration


Rate 30 ML/MIN 


 


 


Lactic Acid Level 3.5 mmol/L 


 


Digoxin Level 1.6 NG/ML 








Imaging





Last Impressions








Chest X-Ray 1/14/18 0800 Signed





Impressions: 





 Service Date/Time:  Sunday, January 14, 2018 07:29 - CONCLUSION:  No 

significant 





 interval change with persistent right greater than left pleural effusions and 





 medial right lung base atelectasis versus consolidation.     Skip Zepeda MD 


 


Abdomen/Pelvis CT 1/9/18 0000 Signed





Impressions: 





 Service Date/Time:  Tuesday, January 9, 2018 21:30 - CONCLUSION:  1. No 





 obstruction or acute inflammatory changes are seen in the abdomen or pelvis. 

2. 





 Apparent complex cystic and solid mass of the mid zone of the right kidney. A 





 followup MRI of the abdomen is recommended with and without contrast in 





 approximately 3 months. This may be helpful in further characterizing a small, 





 probably benign but nonspecific hypodensity of the liver. 3. Nonspecific right 





 greater than left pleural effusions and basilar atelectasis. There is an area 

of 





 nonenhancing right lower lobe pulmonary parenchyma compatible with pneumonia. 





 Infection and post obstructive process would be in the differential. After 





 treatment for presumed pneumonia, a followup CT of the chest, preferably with 





 intravenous contrast, is recommended within the next couple of months, sooner 

if 





 felt clinically indicated.  4. Also a nonspecific moderate size pericardial 





 effusion.     Demian Alegria MD 











Assessment and Plan


Problem List:  


(1) CHF exacerbation


ICD Codes:  I50.9 - Heart failure, unspecified


Status:  Acute


Assessment and Plan


decompensated acute on chronic systolic and diastolic CHF


NYHA class IV


decompensated quickly off milrinone gtt over the weekend, better compensated 

today.  Still with impaired lactate, renal function, and hepatic congestion.  

All consistent with reduced cardiac output and low flow state with 

underperfusion.


Started on low dose digoxin for oral positive inotropic action


Still with low urine output, maintain Mckeon for now and monitor output


Continue Lasix IV for volume overload and monitor Cr closely.


Could consider RHC to determine filling pressures


transplant candidate ?


Could consider Entresto if hypotension improves





Problem Qualifiers





(1) CHF exacerbation:  


Qualified Codes:  I50.9 - Heart failure, unspecified








Aleks Johnson Raymond 15, 2018 09:23

## 2018-01-16 VITALS
TEMPERATURE: 98.6 F | RESPIRATION RATE: 20 BRPM | SYSTOLIC BLOOD PRESSURE: 76 MMHG | DIASTOLIC BLOOD PRESSURE: 64 MMHG | HEART RATE: 117 BPM | OXYGEN SATURATION: 99 %

## 2018-01-16 VITALS
HEART RATE: 119 BPM | OXYGEN SATURATION: 94 % | RESPIRATION RATE: 20 BRPM | DIASTOLIC BLOOD PRESSURE: 73 MMHG | SYSTOLIC BLOOD PRESSURE: 103 MMHG

## 2018-01-16 VITALS — DIASTOLIC BLOOD PRESSURE: 55 MMHG | SYSTOLIC BLOOD PRESSURE: 103 MMHG | HEART RATE: 113 BPM

## 2018-01-16 VITALS
DIASTOLIC BLOOD PRESSURE: 70 MMHG | RESPIRATION RATE: 20 BRPM | TEMPERATURE: 97.8 F | OXYGEN SATURATION: 99 % | SYSTOLIC BLOOD PRESSURE: 98 MMHG | HEART RATE: 114 BPM

## 2018-01-16 VITALS
SYSTOLIC BLOOD PRESSURE: 98 MMHG | HEART RATE: 115 BPM | OXYGEN SATURATION: 97 % | TEMPERATURE: 97.7 F | DIASTOLIC BLOOD PRESSURE: 43 MMHG | RESPIRATION RATE: 20 BRPM

## 2018-01-16 VITALS
SYSTOLIC BLOOD PRESSURE: 90 MMHG | RESPIRATION RATE: 20 BRPM | OXYGEN SATURATION: 97 % | HEART RATE: 120 BPM | TEMPERATURE: 98.4 F | DIASTOLIC BLOOD PRESSURE: 64 MMHG

## 2018-01-16 VITALS
TEMPERATURE: 98.3 F | SYSTOLIC BLOOD PRESSURE: 90 MMHG | HEART RATE: 116 BPM | OXYGEN SATURATION: 97 % | DIASTOLIC BLOOD PRESSURE: 56 MMHG | RESPIRATION RATE: 12 BRPM

## 2018-01-16 VITALS
HEART RATE: 118 BPM | SYSTOLIC BLOOD PRESSURE: 87 MMHG | DIASTOLIC BLOOD PRESSURE: 58 MMHG | OXYGEN SATURATION: 93 % | TEMPERATURE: 98.6 F | RESPIRATION RATE: 20 BRPM

## 2018-01-16 VITALS
OXYGEN SATURATION: 97 % | TEMPERATURE: 97.9 F | DIASTOLIC BLOOD PRESSURE: 55 MMHG | RESPIRATION RATE: 20 BRPM | HEART RATE: 109 BPM | SYSTOLIC BLOOD PRESSURE: 87 MMHG

## 2018-01-16 VITALS
OXYGEN SATURATION: 90 % | RESPIRATION RATE: 25 BRPM | DIASTOLIC BLOOD PRESSURE: 60 MMHG | SYSTOLIC BLOOD PRESSURE: 98 MMHG | HEART RATE: 112 BPM

## 2018-01-16 VITALS — HEART RATE: 131 BPM

## 2018-01-16 VITALS
HEART RATE: 122 BPM | SYSTOLIC BLOOD PRESSURE: 86 MMHG | RESPIRATION RATE: 20 BRPM | OXYGEN SATURATION: 99 % | DIASTOLIC BLOOD PRESSURE: 57 MMHG | TEMPERATURE: 98.6 F

## 2018-01-16 VITALS — HEART RATE: 112 BPM | DIASTOLIC BLOOD PRESSURE: 70 MMHG | SYSTOLIC BLOOD PRESSURE: 99 MMHG

## 2018-01-16 VITALS — DIASTOLIC BLOOD PRESSURE: 65 MMHG | SYSTOLIC BLOOD PRESSURE: 100 MMHG | HEART RATE: 125 BPM

## 2018-01-16 VITALS
TEMPERATURE: 98.8 F | SYSTOLIC BLOOD PRESSURE: 103 MMHG | RESPIRATION RATE: 18 BRPM | DIASTOLIC BLOOD PRESSURE: 73 MMHG | OXYGEN SATURATION: 94 % | HEART RATE: 119 BPM

## 2018-01-16 VITALS — OXYGEN SATURATION: 96 %

## 2018-01-16 VITALS — OXYGEN SATURATION: 98 %

## 2018-01-16 LAB
ALBUMIN SERPL-MCNC: 3.8 GM/DL (ref 3.4–5)
ALP SERPL-CCNC: 72 U/L (ref 45–117)
ALT SERPL-CCNC: 30 U/L (ref 10–53)
AST SERPL-CCNC: 70 U/L (ref 15–37)
BILIRUB SERPL-MCNC: 1.2 MG/DL (ref 0.2–1)
BUN SERPL-MCNC: 34 MG/DL (ref 7–18)
CALCIUM SERPL-MCNC: 8.5 MG/DL (ref 8.5–10.1)
CHLORIDE SERPL-SCNC: 97 MEQ/L (ref 98–107)
CREAT SERPL-MCNC: 1.68 MG/DL (ref 0.5–1)
ERYTHROCYTE [DISTWIDTH] IN BLOOD BY AUTOMATED COUNT: 17.8 % (ref 11.6–17.2)
GFR SERPLBLD BASED ON 1.73 SQ M-ARVRAT: 30 ML/MIN (ref 89–?)
GLUCOSE SERPL-MCNC: 75 MG/DL (ref 74–106)
HCO3 BLD-SCNC: 29.6 MEQ/L (ref 21–32)
HCT VFR BLD CALC: 30.2 % (ref 35–46)
HGB BLD-MCNC: 9.9 GM/DL (ref 11.6–15.3)
INR PPP: 1.5 RATIO
MCH RBC QN AUTO: 30.3 PG (ref 27–34)
MCHC RBC AUTO-ENTMCNC: 32.9 % (ref 32–36)
MCV RBC AUTO: 92.1 FL (ref 80–100)
PF4 HEPARIN CMPLX AB SER QL: NEGATIVE
PHOSPHATE SERPL-MCNC: 2.4 MG/DL (ref 2.5–4.9)
PLATELET # BLD: 47 TH/MM3 (ref 150–450)
PMV BLD AUTO: 10.4 FL (ref 7–11)
PROT SERPL-MCNC: 6.1 GM/DL (ref 6.4–8.2)
PROTHROMBIN TIME: 15.6 SEC (ref 9.8–11.6)
RBC # BLD AUTO: 3.28 MIL/MM3 (ref 4–5.3)
SODIUM SERPL-SCNC: 139 MEQ/L (ref 136–145)
WBC # BLD AUTO: 6.9 TH/MM3 (ref 4–11)

## 2018-01-16 PROCEDURE — 02HV33Z INSERTION OF INFUSION DEVICE INTO SUPERIOR VENA CAVA, PERCUTANEOUS APPROACH: ICD-10-PCS | Performed by: INTERNAL MEDICINE

## 2018-01-16 PROCEDURE — 02HQ32Z INSERTION OF MONITORING DEVICE INTO RIGHT PULMONARY ARTERY, PERCUTANEOUS APPROACH: ICD-10-PCS | Performed by: INTERNAL MEDICINE

## 2018-01-16 PROCEDURE — 30233R1 TRANSFUSION OF NONAUTOLOGOUS PLATELETS INTO PERIPHERAL VEIN, PERCUTANEOUS APPROACH: ICD-10-PCS | Performed by: INTERNAL MEDICINE

## 2018-01-16 RX ADMIN — ASPIRIN 81 MG SCH MG: 81 TABLET ORAL at 09:05

## 2018-01-16 RX ADMIN — POTASSIUM CHLORIDE SCH MEQ: 1.5 POWDER, FOR SOLUTION ORAL at 13:04

## 2018-01-16 RX ADMIN — Medication SCH ML: at 09:00

## 2018-01-16 RX ADMIN — Medication SCH ML: at 21:00

## 2018-01-16 RX ADMIN — DEXTROSE MONOHYDRATE SCH MLS/HR: 5 INJECTION, SOLUTION INTRAVENOUS at 16:14

## 2018-01-16 RX ADMIN — POTASSIUM CHLORIDE SCH MEQ: 1.5 POWDER, FOR SOLUTION ORAL at 09:00

## 2018-01-16 RX ADMIN — POTASSIUM CHLORIDE SCH MEQ: 1.5 POWDER, FOR SOLUTION ORAL at 17:47

## 2018-01-16 RX ADMIN — PANTOPRAZOLE SODIUM SCH MG: 40 INJECTION, POWDER, FOR SOLUTION INTRAVENOUS at 20:00

## 2018-01-16 NOTE — PD.CARD.PN
Subjective


Subjective Remarks


feels better. edema much improved. Renal function unchanged. Creatinine 1.68.





Objective


Medications





Current Medications








 Medications


  (Trade)  Dose


 Ordered  Sig/Sulaiman


 Route  Start Time


 Stop Time Status Last Admin


 


  (NS Flush)  2 ml  BID


 IV FLUSH  1/10/18 09:00


    1/15/18 21:00


 


 


  (NS Flush)  2 ml  UNSCH  PRN


 IV FLUSH  1/9/18 22:15


     


 


 


  (Aspirin Chew)  81 mg  DAILY


 CHEW  1/10/18 09:00


    1/13/18 08:56


 


 


  (Zofran Inj)  4 mg  Q6HR  PRN


 IV PUSH  1/9/18 22:30


    1/15/18 15:04


 


 


 Albumin Human  100 ml @ 


 60 mls/hr  BID@0830,1730


 IV  1/11/18 08:30


   Future Hold 1/14/18 09:05


 


 


  (KCl Powder)  20 meq  TID


 PO  1/11/18 18:00


    1/15/18 17:58


 


 


 Milrinone Lactate


 20 mg/Sodium


 Chloride  100 ml @ 


 8.62 mls/hr  [id number]


 IV  1/13/18 18:00


    1/15/18 16:28


 


 


  (Protonix Inj)  40 mg  Q24H


 IV PUSH  1/13/18 20:00


    1/15/18 19:44


 


 


  (Lovenox Inj)  30 mg  Q24H


 SQ  1/14/18 20:00


   Future Hold 1/14/18 20:26


 


 


  (Imodium)  2 mg  Q4H  PRN


 PO  1/14/18 16:30


    1/15/18 22:18


 


 


 Potassium Chloride  100 ml @ 


 50 mls/hr  Q2H  PRN


 IV  1/15/18 09:00


     


 


 


 Potassium Chloride  100 ml @ 


 50 mls/hr  Q2H  PRN


 IV  1/15/18 09:00


     


 


 


  (K-Lyte Cl  Eff)  50 meq  UNSCH  PRN


 PO  1/15/18 09:00


     


 


 


 Potassium Chloride  100 ml @ 


 25 mls/hr  UNSCH  PRN


 IV  1/15/18 09:00


     


 


 


 Potassium Chloride  100 ml @ 


 50 mls/hr  Q2H  PRN


 IV  1/15/18 09:00


     


 


 


 Magnesium Sulfate


 4 gm/Sodium


 Chloride  100 ml @ 


 50 mls/hr  UNSCH  PRN


 IV  1/15/18 09:00


     


 


 


  (Mag-Ox)  800 mg  UNSCH  PRN


 PO  1/15/18 09:00


     


 


 


 Magnesium Sulfate


 2 gm/Sodium


 Chloride  100 ml @ 


 50 mls/hr  UNSCH  PRN


 IV  1/15/18 09:00


     


 


 


  (K-Phos)  2,000 mg  Q4H  PRN


 PO  1/15/18 09:00


    1/15/18 09:57


 


 


 Sodium Phosphate


 30 mmol/Sodium


 Chloride  250 ml @ 


 42 mls/hr  UNSCH  PRN


 IV  1/15/18 09:00


     


 


 


  (K-Phos)  2,000 mg  UNSCH  PRN


 PO/TUBE  1/15/18 09:00


     


 


 


 Potassium


 Phosphate 30 mmol/


 Sodium Chloride  260 ml @ 


 42 mls/hr  UNSCH  PRN


 IV  1/15/18 09:00


     


 


 


 Sodium Chloride  1,000 ml @ 


 84 mls/hr  [id number]


 IV  1/16/18 07:45


     


 








Vital Signs / I&O





Vital Signs








  Date Time  Temp Pulse Resp B/P (MAP) Pulse Ox O2 Delivery O2 Flow Rate FiO2


 


1/16/18 03:00 98.3 116 12 90/56 (67) 97   


 


1/16/18 03:00     97 Nasal Cannula 3.00 





      Humidified  


 


1/16/18 03:00  116      


 


1/15/18 23:00  113      


 


1/15/18 23:00     96 Nasal Cannula 2.00 


 


1/15/18 23:00 98.2 113 12 103/58 (73) 96   


 


1/15/18 19:44     97 Nasal Cannula 2.00 


 


1/15/18 19:00     98 Nasal Cannula 2.00 


 


1/15/18 19:00 98.3 124 12 101/58 (72) 98   


 


1/15/18 19:00  124      


 


1/15/18 16:28  115  92/55    


 


1/15/18 15:00  116      


 


1/15/18 15:00     96 Nasal Cannula 2.00 


 


1/15/18 15:00 98.2 121 16 86/58 (67) 94   


 


1/15/18 11:00 98.3 121 15 90/53 (65) 94   


 


1/15/18 11:00  117      


 


1/15/18 11:00     94 Nasal Cannula 2.00 


 


1/15/18 09:39     97 Nasal Cannula 2.00 














I/O      


 


 1/15/18 1/15/18 1/15/18 1/16/18 1/16/18 1/16/18





 07:00 15:00 23:00 07:00 15:00 23:00


 


Intake Total 485 ml  380 ml 480 ml  


 


Output Total 550 ml  590 ml 350 ml  


 


Balance -65 ml  -210 ml 130 ml  


 


      


 


Intake Oral 240 ml  200 ml 480 ml  


 


IV Total 245 ml  180 ml   


 


Output Urine Total 550 ml  590 ml 350 ml  


 


# Bowel Movements 1 1 1 1  








Physical Exam


Lungs essentially clear


+1 pedal edema


weight down 3 lbs.


Laboratory





Laboratory Tests








Test


  1/15/18


09:52 1/15/18


14:15 1/16/18


04:35


 


Nasal Screen MRSA (PCR)  NEGATIVE  


 


Staphylococcus aureus


(PCR)(LAB) 


  NEGATIVE 


  


 


 


White Blood Count   6.9 TH/MM3 


 


Red Blood Count   3.28 MIL/MM3 


 


Hemoglobin   9.9 GM/DL 


 


Hematocrit   30.2 % 


 


Mean Corpuscular Volume   92.1 FL 


 


Mean Corpuscular Hemoglobin   30.3 PG 


 


Mean Corpuscular Hemoglobin


Concent 


  


  32.9 % 


 


 


Red Cell Distribution Width   17.8 % 


 


Platelet Count   47 TH/MM3 


 


Mean Platelet Volume   10.4 FL 


 


Prothrombin Time   15.6 SEC 


 


Prothromb Time International


Ratio 


  


  1.5 RATIO 


 


 


Activated Partial


Thromboplast Time 


  


  35.7 SEC 


 


 


Blood Urea Nitrogen   34 MG/DL 


 


Creatinine   1.68 MG/DL 


 


Random Glucose   75 MG/DL 


 


Total Protein   6.1 GM/DL 


 


Albumin   3.8 GM/DL 


 


Calcium Level   8.5 MG/DL 


 


Phosphorus Level   2.4 MG/DL 


 


Alkaline Phosphatase   72 U/L 


 


Aspartate Amino Transf


(AST/SGOT) 


  


  70 U/L 


 


 


Alanine Aminotransferase


(ALT/SGPT) 


  


  30 U/L 


 


 


Total Bilirubin   1.2 MG/DL 


 


Sodium Level   139 MEQ/L 


 


Potassium Level   3.3 MEQ/L 


 


Chloride Level   97 MEQ/L 


 


Carbon Dioxide Level   29.6 MEQ/L 


 


Anion Gap   12 MEQ/L 


 


Estimat Glomerular Filtration


Rate 


  


  30 ML/MIN 


 


 


Lactic Acid Level   3.5 mmol/L 











Assessment and Plan


Problem List:  


(1) CHF exacerbation


ICD Codes:  I50.9 - Heart failure, unspecified


Status:  Acute


Assessment and Plan


Will try administering fluids to improve creatinine. Would consider left heart 

cath once renal function improved. Still feel the patient is dehydrated. 

Continue milrinone for now.





Problem Qualifiers





(1) CHF exacerbation:  


Qualified Codes:  I50.9 - Heart failure, unspecified








Rafiq Niño MD Jan 16, 2018 07:41

## 2018-01-16 NOTE — HHI.HCPN
Reason for visit


   a.  To assist with evaluation and management of symptoms including:  Dyspnea


   b.  To assist medical decision maker(s) with: better understanding of 

current medical conditions; weighing benefits/burdens of medical treatment 

options; making        


        medical treatment decisions.


.





Subjective/Interval History


INTERVAL NOTE:


The patient reports her breathing seems better, but she remains quite weak.  No 

troublesome pain.





Creatinine remains essentially unchanged.  Cardiac monitoring line placement 

attempted today.





Chest x-ray with basilar atelectasis.


.





Advance Directives


Living Will:  Copy in medical record


Health Care Surrogate:  Copy in medical record


Durable Power of :  Never completed


Advance Directive Specifics


Health Care Surrogate(s):


Primary healthcare surrogate his son Angel, secondary is son Kenyon


.


Documented care wishes:


The patient has a living will


.





Objective





Vital Signs








  Date Time  Temp Pulse Resp B/P (MAP) Pulse Ox O2 Delivery O2 Flow Rate FiO2


 


1/16/18 16:14  109  117/63    


 


1/16/18 15:23  112 25 98/60 90   


 


1/16/18 15:03  119 20 103/73 94   


 


1/16/18 15:00     94 Nasal Cannula 4.00 





      Humidified  


 


1/16/18 15:00 98.8 119 18 103/73 (83) 94   


 


1/16/18 15:00  119      


 


1/16/18 14:43 98.6 118 20 87/58 93   


 


1/16/18 13:30  125  100/65 (77)    


 


1/16/18 12:35 98.4 120 20 90/64 97   


 


1/16/18 12:20 97.7 115 20 98/43 97   


 


1/16/18 12:04 98.6 117 20 76/64 99   


 


1/16/18 12:00  131      


 


1/16/18 11:00 98.6 122 20 86/57 (67) 99   


 


1/16/18 11:00     99 Nasal Cannula 2.00 





      Humidified  


 


1/16/18 09:36     96 Nasal Cannula 4.00 


 


1/16/18 07:00     97 Nasal Cannula 4.00 





      Humidified  


 


1/16/18 07:00  109      


 


1/16/18 07:00 97.9 109 20 87/55 (66) 97   


 


1/16/18 03:00 98.3 116 12 90/56 (67) 97   


 


1/16/18 03:00     97 Nasal Cannula 3.00 





      Humidified  


 


1/16/18 03:00  116      


 


1/15/18 23:00  113      


 


1/15/18 23:00     96 Nasal Cannula 2.00 


 


1/15/18 23:00 98.2 113 12 103/58 (73) 96   


 


1/15/18 19:44     97 Nasal Cannula 2.00 


 


1/15/18 19:00     98 Nasal Cannula 2.00 


 


1/15/18 19:00 98.3 124 12 101/58 (72) 98   


 


1/15/18 19:00  124      














Intake & Output  


 


 1/16/18 1/16/18





 07:00 19:00


 


Intake Total 480 ml 469 ml


 


Output Total 350 ml 


 


Balance 130 ml 469 ml


 


  


 


Intake Oral 480 ml 


 


Platelets  469 ml


 


Output Urine Total 350 ml 


 


# Bowel Movements 1 








Physical Exam


CONSTITUTIONAL/GENERAL: This is a weak patient, in no apparent distress.


TUBES/LINES/DRAINS: Nasal O2, IV access, Mckeon


SKIN: No jaundice, rashes, or lesions. Ecchymoses on upper extremities. No 

wounds seen anteriorly. Skin temperature appropriate. Not diaphoretic. 


ENT: Hearing grossly normal. Nose without bleeding or purulent drainage.


NECK: Trachea midline. Supple, nontender. No palpable thyroid enlargement or 

nodularity. 


CARDIOVASCULAR: Regular rate and rhythm with grade 1 systolic murmur. 


RESPIRATORY/CHEST: Symmetric, unlabored respirations. Breath sounds equal 

bilaterally.  Some scattered rales


GASTROINTESTINAL: Abdomen soft, non-tender, nondistended. No hepato-splenomegaly

, or palpable masses. No guarding. Bowel sounds present.


GENITOURINARY: Without palpable bladder distension.  Mckeon catheter is present


NEUROLOGICAL: Awake and alert. Motor and sensory grossly within normal limits. 

Follows commands. Cognitively sharp. Moves all extremities.


PSYCHIATRIC: No obvious anxiety/depression. no apparent hallucinations or other 

psychotic thought process.


.





Diagnostic Tests


Laboratory





Laboratory Tests








Test


  1/13/18


18:00 1/13/18


19:53 1/13/18


20:18 1/13/18


20:30


 


Blood Gas Puncture Site LT RADIAL  LT RADIAL   


 


Blood Gas Patient Temperature 98.6  98.6   


 


Blood Gas HCO3


  6 mmol/L


(22-26) 13 mmol/L


(22-26) 


  


 


 


Blood Gas Base Excess


  -20.8 mmol/L


(-2-2) -11.2 mmol/L


(-2-2) 


  


 


 


Blood Gas Oxygen Saturation 92 % ()  93 % ()   


 


Arterial Blood pH


  7.16


(7.380-7.420) 7.40


(7.380-7.420) 


  


 


 


Arterial Blood Partial


Pressure CO2 18 mmHg


(38-42) 21 mmHg


(38-42) 


  


 


 


Arterial Blood Partial


Pressure O2 98 mmHg


() 83 mmHg


() 


  


 


 


Arterial Blood Oxygen Content


  16.0 Vol %


(12.0-20.0) 15.4 Vol %


(12.0-20.0) 


  


 


 


Arterial Blood


Carboxyhemoglobin 0.5 % (0-4) 


  1.1 % (0-4) 


  


  


 


 


Arterial Blood Methemoglobin 1.2 % (0-2)  1.0 % (0-2)   


 


Blood Gas Hemoglobin


  12.2 G/DL


(12.0-16.0) 11.7 G/DL


(12.0-16.0) 


  


 


 


Oxygen Delivery Device NASAL CANNULA  SM   


 


Blood Gas Liter Flow 2 L/M  5 L/M   


 


Blood Urea Nitrogen


  


  


  28 MG/DL


(7-18) 


 


 


Creatinine


  


  


  1.61 MG/DL


(0.50-1.00) 


 


 


Random Glucose


  


  


  76 MG/DL


() 


 


 


Total Protein


  


  


  7.5 GM/DL


(6.4-8.2) 


 


 


Albumin


  


  


  4.7 GM/DL


(3.4-5.0) 


 


 


Calcium Level


  


  


  8.8 MG/DL


(8.5-10.1) 


 


 


Phosphorus Level


  


  


  2.5 MG/DL


(2.5-4.9) 


 


 


Magnesium Level


  


  


  2.2 MG/DL


(1.5-2.5) 


 


 


Alkaline Phosphatase


  


  


  90 U/L


() 


 


 


Aspartate Amino Transf


(AST/SGOT) 


  


  130 U/L


(15-37) 


 


 


Alanine Aminotransferase


(ALT/SGPT) 


  


  25 U/L (10-53) 


  


 


 


Total Bilirubin


  


  


  3.3 MG/DL


(0.2-1.0) 


 


 


Sodium Level


  


  


  140 MEQ/L


(136-145) 


 


 


Potassium Level


  


  


  5.7 MEQ/L


(3.5-5.1) 


 


 


Chloride Level


  


  


  101 MEQ/L


() 


 


 


Carbon Dioxide Level


  


  


  12.0 MEQ/L


(21.0-32.0) 


 


 


Anion Gap


  


  


  27 MEQ/L


(5-15) 


 


 


Estimat Glomerular Filtration


Rate 


  


  32 ML/MIN


(>89) 


 


 


White Blood Count


  


  


  


  10.1 TH/MM3


(4.0-11.0)


 


Red Blood Count


  


  


  


  4.11 MIL/MM3


(4.00-5.30)


 


Hemoglobin


  


  


  


  12.3 GM/DL


(11.6-15.3)


 


Hematocrit


  


  


  


  37.8 %


(35.0-46.0)


 


Mean Corpuscular Volume


  


  


  


  91.8 FL


(80.0-100.0)


 


Mean Corpuscular Hemoglobin


  


  


  


  29.9 PG


(27.0-34.0)


 


Mean Corpuscular Hemoglobin


Concent 


  


  


  32.6 %


(32.0-36.0)


 


Red Cell Distribution Width


  


  


  


  18.0 %


(11.6-17.2)


 


Platelet Count


  


  


  


  84 TH/MM3


(150-450)


 


Mean Platelet Volume


  


  


  


  9.7 FL


(7.0-11.0)


 


Neutrophils (%) (Auto)


  


  


  


  83.6 %


(16.0-70.0)


 


Lymphocytes (%) (Auto)


  


  


  


  9.4 %


(9.0-44.0)


 


Monocytes (%) (Auto)


  


  


  


  6.8 %


(0.0-8.0)


 


Eosinophils (%) (Auto)


  


  


  


  0.0 %


(0.0-4.0)


 


Basophils (%) (Auto)


  


  


  


  0.2 %


(0.0-2.0)


 


Neutrophils # (Auto)


  


  


  


  8.5 TH/MM3


(1.8-7.7)


 


Lymphocytes # (Auto)


  


  


  


  0.9 TH/MM3


(1.0-4.8)


 


Monocytes # (Auto)


  


  


  


  0.7 TH/MM3


(0-0.9)


 


Eosinophils # (Auto)


  


  


  


  0.0 TH/MM3


(0-0.4)


 


Basophils # (Auto)


  


  


  


  0.0 TH/MM3


(0-0.2)


 


CBC Comment    AUTO DIFF 


 


Differential Comment


  


  


  


  AUTO DIFF


CONFIRMED


 


Prothrombin Time


  


  


  


  19.5 SEC


(9.8-11.6)


 


Prothromb Time International


Ratio 


  


  


  1.9 RATIO 


 


 


Activated Partial


Thromboplast Time 


  


  


  38.3 SEC


(24.3-30.1)


 


Lactic Acid Level


  


  


  


  17.6 mmol/L


(0.4-2.0)


 


B-Type Natriuretic Peptide


  


  


  


  GREATER THAN


5000 PG/ML


 


Test


  1/14/18


02:45 1/14/18


03:55 1/14/18


04:29 1/15/18


04:23


 


Stool C. difficile Toxin (PCR)


  NEGATIVE


(NEGATIVE) 


  


  


 


 


Stl C. difficile Toxin


Epiderm 027 PRESUMPTIVE


NEGATIVE 


  


  


 


 


White Blood Count


  


  8.5 TH/MM3


(4.0-11.0) 


  7.2 TH/MM3


(4.0-11.0)


 


Red Blood Count


  


  3.30 MIL/MM3


(4.00-5.30) 


  3.41 MIL/MM3


(4.00-5.30)


 


Hemoglobin


  


  9.9 GM/DL


(11.6-15.3) 


  10.5 GM/DL


(11.6-15.3)


 


Hematocrit


  


  29.5 %


(35.0-46.0) 


  31.0 %


(35.0-46.0)


 


Mean Corpuscular Volume


  


  89.4 FL


(80.0-100.0) 


  90.7 FL


(80.0-100.0)


 


Mean Corpuscular Hemoglobin


  


  30.0 PG


(27.0-34.0) 


  30.7 PG


(27.0-34.0)


 


Mean Corpuscular Hemoglobin


Concent 


  33.5 %


(32.0-36.0) 


  33.9 %


(32.0-36.0)


 


Red Cell Distribution Width


  


  17.7 %


(11.6-17.2) 


  17.9 %


(11.6-17.2)


 


Platelet Count


  


  67 TH/MM3


(150-450) 


  52 TH/MM3


(150-450)


 


Mean Platelet Volume


  


  9.5 FL


(7.0-11.0) 


  10.1 FL


(7.0-11.0)


 


Neutrophils (%) (Auto)


  


  82.0 %


(16.0-70.0) 


  


 


 


Lymphocytes (%) (Auto)


  


  11.5 %


(9.0-44.0) 


  


 


 


Monocytes (%) (Auto)


  


  6.3 %


(0.0-8.0) 


  


 


 


Eosinophils (%) (Auto)


  


  0.0 %


(0.0-4.0) 


  


 


 


Basophils (%) (Auto)


  


  0.2 %


(0.0-2.0) 


  


 


 


Neutrophils # (Auto)


  


  7.0 TH/MM3


(1.8-7.7) 


  


 


 


Lymphocytes # (Auto)


  


  1.0 TH/MM3


(1.0-4.8) 


  


 


 


Monocytes # (Auto)


  


  0.5 TH/MM3


(0-0.9) 


  


 


 


Eosinophils # (Auto)


  


  0.0 TH/MM3


(0-0.4) 


  


 


 


Basophils # (Auto)


  


  0.0 TH/MM3


(0-0.2) 


  


 


 


CBC Comment  AUTO DIFF   


 


Differential Comment


  


  AUTO DIFF


CONFIRMED 


  


 


 


Platelet Estimate  LOW (NORMAL)   


 


Platelet Morphology Comment


  


  NORMAL


(NORMAL) 


  


 


 


Target Cells  1+ (NORMAL)   


 


Blood Urea Nitrogen


  


  29 MG/DL


(7-18) 


  31 MG/DL


(7-18)


 


Creatinine


  


  1.76 MG/DL


(0.50-1.00) 


  1.67 MG/DL


(0.50-1.00)


 


Random Glucose


  


  112 MG/DL


() 


  85 MG/DL


()


 


Total Protein


  


  6.1 GM/DL


(6.4-8.2) 


  6.3 GM/DL


(6.4-8.2)


 


Albumin


  


  3.9 GM/DL


(3.4-5.0) 


  4.1 GM/DL


(3.4-5.0)


 


Calcium Level


  


  8.1 MG/DL


(8.5-10.1) 


  8.6 MG/DL


(8.5-10.1)


 


Phosphorus Level


  


  1.8 MG/DL


(2.5-4.9) 


  1.5 MG/DL


(2.5-4.9)


 


Magnesium Level


  


  1.8 MG/DL


(1.5-2.5) 


  1.7 MG/DL


(1.5-2.5)


 


Alkaline Phosphatase


  


  65 U/L


() 


  64 U/L


()


 


Aspartate Amino Transf


(AST/SGOT) 


  171 U/L


(15-37) 


  93 U/L (15-37) 


 


 


Alanine Aminotransferase


(ALT/SGPT) 


  35 U/L (10-53) 


  


  32 U/L (10-53) 


 


 


Total Bilirubin


  


  1.7 MG/DL


(0.2-1.0) 


  1.7 MG/DL


(0.2-1.0)


 


Sodium Level


  


  143 MEQ/L


(136-145) 


  141 MEQ/L


(136-145)


 


Potassium Level


  


  4.7 MEQ/L


(3.5-5.1) 


  3.6 MEQ/L


(3.5-5.1)


 


Chloride Level


  


  102 MEQ/L


() 


  97 MEQ/L


()


 


Carbon Dioxide Level


  


  22.7 MEQ/L


(21.0-32.0) 


  30.0 MEQ/L


(21.0-32.0)


 


Anion Gap


  


  18 MEQ/L


(5-15) 


  14 MEQ/L


(5-15)


 


Estimat Glomerular Filtration


Rate 


  28 ML/MIN


(>89) 


  30 ML/MIN


(>89)


 


Lactic Acid Level


  


  11.8 mmol/L


(0.4-2.0) 


  3.5 mmol/L


(0.4-2.0)


 


B-Type Natriuretic Peptide


  


  GREATER THAN


5000 PG/ML 


  


 


 


Blood Gas Puncture Site   LT RADIAL  


 


Blood Gas Patient Temperature   98.6  


 


Blood Gas HCO3


  


  


  22 mmol/L


(22-26) 


 


 


Blood Gas Base Excess


  


  


  -0.7 mmol/L


(-2-2) 


 


 


Blood Gas Oxygen Saturation   84 % ()  


 


Arterial Blood pH


  


  


  7.49


(7.380-7.420) 


 


 


Arterial Blood Partial


Pressure CO2 


  


  29 mmHg


(38-42) 


 


 


Arterial Blood Partial


Pressure O2 


  


  51 mmHg


() 


 


 


Arterial Blood Oxygen Content


  


  


  11.6 Vol %


(12.0-20.0) 


 


 


Arterial Blood


Carboxyhemoglobin 


  


  1.4 % (0-4) 


  


 


 


Arterial Blood Methemoglobin   1.2 % (0-2)  


 


Blood Gas Hemoglobin


  


  


  9.8 G/DL


(12.0-16.0) 


 


 


Oxygen Delivery Device   NASAL CANNULA  


 


Blood Gas Liter Flow   2 L/M  


 


Prothrombin Time


  


  


  


  16.1 SEC


(9.8-11.6)


 


Prothromb Time International


Ratio 


  


  


  1.6 RATIO 


 


 


Activated Partial


Thromboplast Time 


  


  


  46.4 SEC


(24.3-30.1)


 


Digoxin Level


  


  


  


  1.6 NG/ML


(0.8-2.0)


 


Test


  1/15/18


09:52 1/15/18


14:15 1/16/18


04:35 


 


 


Heparin-Induced Platelet Ab


(Alyson) NEGATIVE


(NEGATIVE) 


  


  


 


 


HIPA Patient Optical Density


  0.125 O.D.


(0.000-0.300) 


  


  


 


 


Nasal Screen MRSA (PCR)


  


  NEGATIVE


(NEGATIVE) 


  


 


 


Staphylococcus aureus


(PCR)(LAB) 


  NEGATIVE


(NEGATIVE) 


  


 


 


White Blood Count


  


  


  6.9 TH/MM3


(4.0-11.0) 


 


 


Red Blood Count


  


  


  3.28 MIL/MM3


(4.00-5.30) 


 


 


Hemoglobin


  


  


  9.9 GM/DL


(11.6-15.3) 


 


 


Hematocrit


  


  


  30.2 %


(35.0-46.0) 


 


 


Mean Corpuscular Volume


  


  


  92.1 FL


(80.0-100.0) 


 


 


Mean Corpuscular Hemoglobin


  


  


  30.3 PG


(27.0-34.0) 


 


 


Mean Corpuscular Hemoglobin


Concent 


  


  32.9 %


(32.0-36.0) 


 


 


Red Cell Distribution Width


  


  


  17.8 %


(11.6-17.2) 


 


 


Platelet Count


  


  


  47 TH/MM3


(150-450) 


 


 


Mean Platelet Volume


  


  


  10.4 FL


(7.0-11.0) 


 


 


Prothrombin Time


  


  


  15.6 SEC


(9.8-11.6) 


 


 


Prothromb Time International


Ratio 


  


  1.5 RATIO 


  


 


 


Activated Partial


Thromboplast Time 


  


  35.7 SEC


(24.3-30.1) 


 


 


Blood Urea Nitrogen


  


  


  34 MG/DL


(7-18) 


 


 


Creatinine


  


  


  1.68 MG/DL


(0.50-1.00) 


 


 


Random Glucose


  


  


  75 MG/DL


() 


 


 


Total Protein


  


  


  6.1 GM/DL


(6.4-8.2) 


 


 


Albumin


  


  


  3.8 GM/DL


(3.4-5.0) 


 


 


Calcium Level


  


  


  8.5 MG/DL


(8.5-10.1) 


 


 


Phosphorus Level


  


  


  2.4 MG/DL


(2.5-4.9) 


 


 


Alkaline Phosphatase


  


  


  72 U/L


() 


 


 


Aspartate Amino Transf


(AST/SGOT) 


  


  70 U/L (15-37) 


  


 


 


Alanine Aminotransferase


(ALT/SGPT) 


  


  30 U/L (10-53) 


  


 


 


Total Bilirubin


  


  


  1.2 MG/DL


(0.2-1.0) 


 


 


Sodium Level


  


  


  139 MEQ/L


(136-145) 


 


 


Potassium Level


  


  


  3.3 MEQ/L


(3.5-5.1) 


 


 


Chloride Level


  


  


  97 MEQ/L


() 


 


 


Carbon Dioxide Level


  


  


  29.6 MEQ/L


(21.0-32.0) 


 


 


Anion Gap


  


  


  12 MEQ/L


(5-15) 


 


 


Estimat Glomerular Filtration


Rate 


  


  30 ML/MIN


(>89) 


 


 


Lactic Acid Level


  


  


  3.5 mmol/L


(0.4-2.0) 


 


 


B-Type Natriuretic Peptide


  


  


  GREATER THAN


5000 PG/ML 


 








Result Diagram:  


1/16/18 0435                                                                   

             1/16/18 0435





Imaging





Last Impressions








Chest X-Ray 1/16/18 0000 Signed





Impressions: 





 Service Date/Time:  Tuesday, January 16, 2018 14:16 - CONCLUSION:  Placement 

of 





 right jugular catheter terminating at the level of the right atrium with no 





 evidence of complication such as pneumothorax. Otherwise stable chest     

Brett Machado MD 


 


Abdomen/Pelvis CT 1/9/18 0000 Signed





Impressions: 





 Service Date/Time:  Tuesday, January 9, 2018 21:30 - CONCLUSION:  1. No 





 obstruction or acute inflammatory changes are seen in the abdomen or pelvis. 

2. 





 Apparent complex cystic and solid mass of the mid zone of the right kidney. A 





 followup MRI of the abdomen is recommended with and without contrast in 





 approximately 3 months. This may be helpful in further characterizing a small, 





 probably benign but nonspecific hypodensity of the liver. 3. Nonspecific right 





 greater than left pleural effusions and basilar atelectasis. There is an area 

of 





 nonenhancing right lower lobe pulmonary parenchyma compatible with pneumonia. 





 Infection and post obstructive process would be in the differential. After 





 treatment for presumed pneumonia, a followup CT of the chest, preferably with 





 intravenous contrast, is recommended within the next couple of months, sooner 

if 





 felt clinically indicated.  4. Also a nonspecific moderate size pericardial 





 effusion.     Demian Alegria MD 











Assessment and Plan


Disease Oriented Problem List:  


(1) cardiomyopathy


(2) congestive failure with pronounced right side failure symptoms


(3) low flow state, with some hepatic, renal, and GI effects


(4) CHF, first diagnosed October 2017


(5) right kidney cyst on CT scan, possibly complex


(6) colitis, possible ischemic


(7) GERD


(8) history of hypertension


(9) history of hyperthyroid


(10) peripheral neuropathy


(11) history of B12 deficiency


(12) history of avascular necrosis left hip 2003


Symptom Scale:  


(1) dyspnea


0-10 Scale:  1





(2) weakness


0-10 Scale:  6





Pertinent Non-Medical Issues


Psychosocial:  , 3 sons living locally, retired principal of [employer]


Spiritual:  Spirituality and her kimberly has been very important to her.  She has 

a [employer] background, but has been [employer] for many years, currently being 

supported by her .


Legal:  The patient has capacity for decision-making.  She has designated her 

sons Angel and Kenyon as primary and secondary HCS respectively.


Ethical issues impacting care: None


.


Important Contacts


Son: Angel Garcia [phone]


Son: Kenyon Garcia [phone]


.


Prognosis


Overall, her prognosis is quite guarded.  She has significant cardiomyopathy 

with low flow state.  She will be appropriate for hospice services if the 

upcoming studies confirm end-stage disease, and if the goals become solely 

comfort oriented.


.


Code Status:  Full Code


Plan


* FULL CODE


* DECISION-MAKING:  The patient has capacity for decision-making.  She has 

designated her sons Angel and Kenyon as primary and secondary HCS 

respectively.


* GOALS:  There has been discussion about possible placement of an AICD, and 

the patient would want that.  She is willing to undergo heart cath to see if 

that may also help in effective treatment.  If her kidneys were to fail, she 

would want additional discussion prior to considering dialysis.  If she suffers 

cardiac arrest and is on a ventilator, she reports she would not want to be 

kept on life support "very long if I really wasn't going to get much better."  

She is open to hospice services if her disease processes or complications seen 

to be progressing or if additional complications confirm that she is end-stage.


* SYMPTOMS: The patient's dyspnea has improved over the past days as the urine 

output has increased and the edema has subsided.  She has no pain.  I have no 

additional medication recommendations at this time.


* It is likely she will have additional investigative procedures, and further 

discussions regarding prognosis and goals will be undertaken as that 

information becomes more clear.


* Palliative Care will continue to follow the patient during this 

hospitalization.


.





Time Spent


Total Floor Time (mins):  29


Face to Face Time (mins):  16


>50% Counseling/Coord of Care:  Yes





Attestation


To help prompt me to consider important information that might be impacting 

today's encounter and assessment, information from prior notes written by 

myself or my colleagues may have been "brought forward" into today's note.  My 

signature on this note, however, is an attestation that I personally performed 

the exam, history, and/or decision-making noted today, and, unless otherwise 

indicated, the interactions with patient, family, and staff as well as the 

review of records all occurred today.  I also attest that the listed assessment 

and stated plan reflect my best clinical judgment today based on the 

combination of historical information, prior notes, and today's exam/ 

interactions.  When time spent is documented, it refers only to time spent 

today by the signer, or if indicated, combined time spent today by 

collaborating physician/nurse practitioner.











Nina Garcia MD Jan 16, 2018 16:47

## 2018-01-16 NOTE — PD.PROCEDR
Procedure Note


Procedure


Sycamore-Farhan Catheter Placement





 Indication: Hemodynamic monitoring/PCWP/LVEDP/CI measurement


 


A time-out was completed verifying correct patient, procedure, site, positioning

, and special equipment if applicable. The patient was placed in a dependent 

position appropriate for central line placement based on the vein already 

cannulated with a 9F Cordis catheter. The patients right neck was already 

prepped and draped in sterile fashion. A triple lumen continuous cardiac output 

Sycamore-Farhan catheter was brought onto the field and each line flushed with 

sterile saline and the SVO2 sensor calibrated. The catheter was introduced into 

the Cordis catheter to a distance of 17 cm. The balloon was then inflated and 

the catheter was advanced through the right ventricle with RV pressure reading 

40-45/17-20. After multiple attempts by myself and Dr. Kevin, we were not able 

to get catheter into PAC. The catheter was locked to the Cordis with the tip 

inserted to a distance of 45 cm and a sterile dressing applied. Dr. Niño 

will attempt to float under Fluoro in am





Estimated Blood Loss: None for PA catheter


The patient tolerated the procedure well and there were no complications.











Carlos Arciniega MD Jan 16, 2018 14:20

## 2018-01-16 NOTE — PD.PROCEDR
Central Line Procedure


REASON FOR PROCEDURE


Central venous access





PROCEDURE PERFORMED


Central line placement: RIJ introducer placement with US





CONSENT


Informed consent for procedure was obtained and time out performed.  The risks 

and benefits of the procedure were discussed to include but limited to bleeding

, clot formation, infection, and even death.





ANESTHESIA


Local injection of 1% Lidocaine





DESCRIPTION OF THE PROCEDURE


The patient was placed in supine, mild Trendelenburg position.  The area was 

exposed and cleansed with ChloraPrep, times two.  Large sterile drape was used 

to cover the patient, with the site exposed, under sterile conditions including 

cap, face mask, sterile gown, and sterile gloves.  On single attempt, the 

introducer needle was inserted with negative pressure in syringe and venous 

flash was obtained.  The guide wire was then advanced without any restriction 

and the needle was removed.  The dilator was used without any complications.  

Using Seldinger technique the introducer catheter was advanced over the guide 

wire and the guide wire was removed.  All ports were aspirated with dark venous 

blood return and flushed easily with sterile saline.  All ports were capped.  

Antibiotic disc was placed around central line at puncture site.  The central 

line was secured to the skin with two interrupted 2.0 silk sutures.  The area 

was bandaged with sterile see-through central line bandage.





RADIOLOGICAL DATA


Ultrasound guidance was used to locate RIJ.  Doppler/color flow was used to 

confirm venous flow.





COMPLICATIONS:


No apparent complications





ESTIMATED BLOOD LOSS:


Less than 1 cc.











Carlos Arciniega MD Jan 16, 2018 14:13

## 2018-01-16 NOTE — RADRPT
EXAM DATE/TIME:  01/16/2018 18:34 

 

[facility] COMPARISON:     

CHEST SINGLE AP, January 16, 2018, 16:26.

 

                     

INDICATIONS :     

PA reposition placement.

                     

 

MEDICAL HISTORY :            

Hypertension. Hernia, hiatal. Gastroesophageal reflux disease.IBS.   

 

SURGICAL HISTORY :     

Cholecystectomy.   

 

ENCOUNTER:     

Subsequent                                        

 

ACUITY:     

2 days      

 

PAIN SCORE:     

0/10

 

LOCATION:     

Bilateral chest 

 

FINDINGS:     

A single portable frontal view the chest shows no significant change. Bilateral pleural effusions and
 bibasilar pulmonary consolidations. The right is worse than the left. The tip of the [place]-Far[place] lesley
ter projects in the region of the interlobar pulmonary artery on the right. No pneumothorax. Loculate
d effusion involving the right apex. Heart is mildly enlarged. Bony structures are unremarkable.

 

CONCLUSION:     

1. Tip of the [place]-Far[place] catheter in the region of the interlobar pulmonary artery on the right.

2. Unchanged bilateral pleural effusions and bilateral pulmonary infiltrates.

 

 

 

 Anton Kyle Jr., MD on January 16, 2018 at 18:57           

Board Certified Radiologist.

 This report was verified electronically.

## 2018-01-16 NOTE — RADRPT
EXAM DATE/TIME:  01/16/2018 16:14 

 

[facility] COMPARISON:     

CHEST SINGLE AP, January 16, 2018, 14:16.

 

                     

INDICATIONS :     

PA Catheter repostioning.

                     

 

MEDICAL HISTORY :            

Hypertension. Hernia, hiatal. Gastroesophageal reflux disease.IBS.   

 

SURGICAL HISTORY :     

Cholecystectomy.   

 

ENCOUNTER:     

Subsequent                                        

 

ACUITY:     

3 days      

 

PAIN SCORE:     

Non-responsive.

 

LOCATION:     

Bilateral chest 

 

FINDINGS:     

Repositioning of the right  catheter is appreciated which curls in the anterior chest to the left a
nd most likely terminates in the right ventricle. Chest is otherwise stable.     

 

 

CONCLUSION:     

Repositioning right jugular catheter now having appearance of terminating the left of midline in the 
right ventricle.

 

 

 

 Brett Machado MD on January 16, 2018 at 16:47           

Board Certified Radiologist.

 This report was verified electronically.

## 2018-01-16 NOTE — HHI.CCPN
Subjective


Remarks/Hospital Course


Hospital Course:





72-year-old female with a medical history significant for CHF, cardiomyopathy 

who was admitted initially at [facility] from December 25 21


And subsequently discharged.  She has been very weak since her discharge and 

has had diarrhea for several months.  She has also had nausea and poor appetite 

as well as long-standing shortness of breath.  She presented back on January 9, 2018 to [facility] ER with generalized weakness as well as some shortness 

of breath.  She had significant edema in her lower extremities.  She was 

reportedly admitted with a sepsis at [facility] in December 2017 and CHF.

  Her LVEF is noted to be 20%.  During previous hospitalization she was treated 

with IV antibiotics and pressors.  She was also treated with antibiotics at 

that time.


Patient was admitted by [facility] on January 9 and was 

evaluated by cardiology.  She was initiated on diuretics, IV albumin as well as 

milrinone gtt.  Initially she received Rocephin and Flagyl which was 

subsequently stopped.


Milrinone gtt. was titrated off at 12 noon on 1/13.  Patient developed 

worsening shortness of breath and some altered mental status and abdominal pain 

subsequently.  She remained on 2 L nasal cannula however was slightly 

tachypneic.  ABG was done for further evaluation around 6 PM which revealed 

severe metabolic acidosis with pH 7.16, PCO2 18, PO2 98 and bicarbonate 6. 

Critical care was consulted on 1/13 around 6:30 PM.  Dr. Mccall was contacted by 

ICU RN N ordered 5 amps of sodium bicarbonate IV push.  Patient's milrinone was 

resumed at 0.5 mics per KG per minute.  I evaluated the patient after being 

notified of the consult and ordered stat labs and chest x-ray.  When I 

evaluated the patient she was encephalopathic/lethargic with tachypnea.  She 

had just received 5 A of sodium bicarbonate IV push.  Stat labs are pending.





Subjective:





1/14: remains in distress. lactate still elevated, and although clearing, very 

slowly. delayed clearance suggestive of higher mortality rate. poor urine 

output despite increasing doses of aggressive diuresis. had long discussion 

with patient, and she states she knows her heart is failing. had discussion 

with Dr. Mccall, and we both agree patient will need left and right heart caths 

in the AM and get objective evidence of PVR, PCWP and cardiac filling and 

output. she may need dialysis in order to offload intravascular volume from 

right heart failure.





1/15: uop improved from overnight, now 50-60cc/hr. remains on lasix drip. NPO 

for possible LHC/RHC. Cr remains elevated, but stable: likely YOLIE from ATN from 

poor perfusion 48h ago. patient subjectively feels improved and complaining of 

less fatigue and sob.





1/16: Remains critically ill, MAP 60-61. Creat 1.68. Getting fluids per Dr. Niño in attempt to improve creat for Cath. WIll place central line ofr 

pressor/inotrope infusion and also CVP monitoring.  ml in 24 hours





Objective





Vital Signs








  Date Time  Temp Pulse Resp B/P (MAP) Pulse Ox O2 Delivery O2 Flow Rate FiO2


 


1/16/18 03:00 98.3 116 12 90/56 (67) 97   


 


1/16/18 03:00      Nasal Cannula 3.00 





      Humidified  


 


1/13/18 07:55        21














Intake and Output   


 


 1/16/18 1/16/18 1/17/18





 08:00 16:00 00:00


 


Intake Total 480 ml  


 


Output Total 350 ml  


 


Balance 130 ml  








Result Diagram:  


1/16/18 0435                                                                   

             1/16/18 0435





Objective Remarks


HEENT/Neuro: No pallor or icterus, tongue moist, LOUISA, awake and alert.


Neck: JVD above the level of the mandible.


Chest/pulmonary: unlabored. remains on nc o2. Diminished breath sounds at the 

bases


Cardiovascular: Tachycardic with heart rate 110s, sinus by tele. No murmurs. 

Remains on milrinone 0.5 mcg/kg/min


GI/abdomen: Soft, mildly tender to palpation diffusely. no guarding


Extremities: Warm bilaterally, bilateral edema





A/P


Assessment and Plan


Assessment: 72yF with biventricular failure, severe. was in cardiogenic shock 

when milrinone was weaned. now with persistently elevated lactate, although 

improving. and remains on milrinone therapy. still clinically appears volume 

overloaded, and likely more RV failure with cor pulmonale, but LV failure canot 

be ruled out now with bilateral pleural effusions.D/W Dr. Tavarez, he agrees 

on RHC. Hold off LHC for now- may not be a candidate surgical treatment if 

needed. for  


Active problems:


Severe biventricular failure


Cor pulmonale


Cardiogenic shock


Acute hypoxic respiratory failure - improving


Bilateral pleural effusion


Congestive hepatopathy - improving.


Acute kidney injury secondary to cardiogenic shock


Severe pulmonary hypertension


Nonischemic cardiomyopathy with LVEF 20%


Metabolic acidosis: Suspect low flow state causing lactic acidosis with 

probable bowel ischemia


Diarrhea - improving.


Abdominal pain


Tachycardia


Thrombocytopenia





Plan:





D/W Dr. Niño. Plan for PAC placement for PCWP, indirect LVEDP, CO/CI and to 

guide Therapy accordingly


All diuretics held by Dr. Niño, and patient placed on IVF 84 ml per hour 


I haver discussed with Dr. Niño that patient has clinical evidence of BiV 

failure (bilateral pl effusion, JVD, wt gain, BNP>5000)


Best option to assess fluid status and guide therapy is RHC in this patient


milrinone at 0.5 mcg/kg/hr


serial LFTs, BMP, CBC


trend lactates, remains elevated


watch uop


requires herrera


wean o2 by nc for goal spo2 > 92%


Lomotil for diarrhea: likely this is from ischemia, but patient says it gives 

her symptomatic relief, and she specifically requests it.


HIT panel pending





Remain in ICU. critically ill. off pathway. Plan for RHC after transfusing 2U 

platelets





CCT 35 MIN











Carlos Arciniega MD Jan 16, 2018 09:28

## 2018-01-16 NOTE — RADRPT
EXAM DATE/TIME:  01/16/2018 14:16 

 

[facility] COMPARISON:     

CHEST SINGLE AP, January 16, 2018, 9:36.

 

                     

INDICATIONS :     

Evaluate swan-jun cathter placement

                     

 

MEDICAL HISTORY :            

Hypertension. Hernia, hiatal. Gastroesophageal reflux disease.IBS.   

 

SURGICAL HISTORY :        

Cholecystectomy. Bilateral hip replacements.

 

ENCOUNTER:     

Subsequent                                        

 

ACUITY:     

4 - 6 days      

 

PAIN SCORE:     

Non-responsive.

 

LOCATION:     

Bilateral chest 

 

FINDINGS:     

Again noted is calcification in the aortic knob and cardiomegaly compensated with bilateral pulmonary
 opacities stable left base and persistent on the right which portion of which is pleural effusion.

                          There is placement of a right jugular catheter which appears to terminate a
t the level of the right atrium with no evidence of pneumothorax

 

 

CONCLUSION:     

Placement of right jugular catheter terminating at the level of the right atrium with no evidence of 
complication such as pneumothorax. Otherwise stable chest

 

 

 

 Brett Machado MD on January 16, 2018 at 14:42           

Board Certified Radiologist.

 This report was verified electronically.

## 2018-01-16 NOTE — RADRPT
EXAM DATE/TIME:  01/16/2018 09:36 

 

[facility] COMPARISON:     

CHEST SINGLE AP, January 09, 2018, 19:39.  CHEST SINGLE AP, January 14, 2018, 7:29.

 

                     

INDICATIONS :     

Short of breath.

                     

 

MEDICAL HISTORY :            

Hypertension. Hernia, hiatal. Gastroesophageal reflux disease.IBS.   

 

SURGICAL HISTORY :        

Cholecystectomy. Bilateral hip replacements.

 

ENCOUNTER:     

Subsequent                                        

 

ACUITY:     

4 - 6 days      

 

PAIN SCORE:     

0/10

 

LOCATION:     

Bilateral chest 

 

FINDINGS:     

Portable AP view of the chest demonstrate stable mild enlargement of the cardiac silhouette. There ar
e persistent bibasilar pleural-parenchymal opacities, right greater than left. These are stable from 
the study from 2 days ago. No pneumothorax is visualized. Bones and soft tissues demonstrate no acute
 finding.

 

CONCLUSION:     

1. Stable chest x-ray with bibasilar opacities likely representing pleural effusions with associated 
volume loss and/or air space consolidation.

2. Stable mild enlargement of the cardiac silhouette.

 

 

 

 Demian Evans MD on January 16, 2018 at 10:32           

Board Certified Radiologist.

 This report was verified electronically.

## 2018-01-16 NOTE — RADRPT
EXAM DATE/TIME:  01/16/2018 16:26 

 

[facility] COMPARISON:     

No previous studies available for comparison.

 

                     

INDICATIONS :     

PAC advancement.

                     

 

MEDICAL HISTORY :            

Hypertension. Hernia, hiatal. Gastroesophageal reflux disease.IBS.   

 

SURGICAL HISTORY :     

Cholecystectomy.   

 

ENCOUNTER:     

Subsequent                                        

 

ACUITY:     

2 days      

 

PAIN SCORE:     

10/10

 

LOCATION:     

Bilateral chest 

 

FINDINGS:     

Right tibia catheter reposition now appearing to progress out of the right ventricle probably into th
e main pulmonary artery.

 

 

CONCLUSION:     

Reposition a right jugular venous catheter terminal position in the main pulmonary artery 

 

 

 Brett Machado MD on January 16, 2018 at 16:48           

Board Certified Radiologist.

 This report was verified electronically.

## 2018-01-17 VITALS
OXYGEN SATURATION: 97 % | RESPIRATION RATE: 16 BRPM | TEMPERATURE: 97.3 F | HEART RATE: 109 BPM | DIASTOLIC BLOOD PRESSURE: 60 MMHG | SYSTOLIC BLOOD PRESSURE: 96 MMHG

## 2018-01-17 VITALS
RESPIRATION RATE: 18 BRPM | DIASTOLIC BLOOD PRESSURE: 62 MMHG | SYSTOLIC BLOOD PRESSURE: 96 MMHG | TEMPERATURE: 98.9 F | OXYGEN SATURATION: 94 % | HEART RATE: 132 BPM

## 2018-01-17 VITALS — OXYGEN SATURATION: 98 %

## 2018-01-17 VITALS
TEMPERATURE: 97.4 F | SYSTOLIC BLOOD PRESSURE: 111 MMHG | DIASTOLIC BLOOD PRESSURE: 49 MMHG | OXYGEN SATURATION: 99 % | HEART RATE: 109 BPM | RESPIRATION RATE: 18 BRPM

## 2018-01-17 VITALS — HEART RATE: 112 BPM

## 2018-01-17 VITALS — SYSTOLIC BLOOD PRESSURE: 90 MMHG | HEART RATE: 115 BPM | DIASTOLIC BLOOD PRESSURE: 64 MMHG

## 2018-01-17 VITALS — DIASTOLIC BLOOD PRESSURE: 64 MMHG | SYSTOLIC BLOOD PRESSURE: 91 MMHG | HEART RATE: 115 BPM

## 2018-01-17 VITALS
HEART RATE: 115 BPM | SYSTOLIC BLOOD PRESSURE: 92 MMHG | DIASTOLIC BLOOD PRESSURE: 66 MMHG | OXYGEN SATURATION: 98 % | RESPIRATION RATE: 16 BRPM | TEMPERATURE: 97.9 F

## 2018-01-17 VITALS — OXYGEN SATURATION: 99 %

## 2018-01-17 VITALS
TEMPERATURE: 99.3 F | SYSTOLIC BLOOD PRESSURE: 83 MMHG | RESPIRATION RATE: 16 BRPM | HEART RATE: 112 BPM | DIASTOLIC BLOOD PRESSURE: 55 MMHG | OXYGEN SATURATION: 98 %

## 2018-01-17 VITALS
DIASTOLIC BLOOD PRESSURE: 60 MMHG | RESPIRATION RATE: 15 BRPM | HEART RATE: 126 BPM | OXYGEN SATURATION: 94 % | TEMPERATURE: 97.7 F | SYSTOLIC BLOOD PRESSURE: 93 MMHG

## 2018-01-17 VITALS — OXYGEN SATURATION: 94 %

## 2018-01-17 VITALS — OXYGEN SATURATION: 80 %

## 2018-01-17 VITALS — OXYGEN SATURATION: 96 %

## 2018-01-17 LAB
ALBUMIN SERPL-MCNC: 4.1 GM/DL (ref 3.4–5)
ALP SERPL-CCNC: 68 U/L (ref 45–117)
ALT SERPL-CCNC: 24 U/L (ref 10–53)
AST SERPL-CCNC: 43 U/L (ref 15–37)
BILIRUB SERPL-MCNC: 1.1 MG/DL (ref 0.2–1)
BUN SERPL-MCNC: 34 MG/DL (ref 7–18)
BUN SERPL-MCNC: 34 MG/DL (ref 7–18)
BUN SERPL-MCNC: 35 MG/DL (ref 7–18)
CALCIUM SERPL-MCNC: 8.4 MG/DL (ref 8.5–10.1)
CALCIUM SERPL-MCNC: 8.7 MG/DL (ref 8.5–10.1)
CALCIUM SERPL-MCNC: 8.8 MG/DL (ref 8.5–10.1)
CHLORIDE SERPL-SCNC: 100 MEQ/L (ref 98–107)
CHLORIDE SERPL-SCNC: 98 MEQ/L (ref 98–107)
CHLORIDE SERPL-SCNC: 98 MEQ/L (ref 98–107)
CREAT SERPL-MCNC: 1.58 MG/DL (ref 0.5–1)
CREAT SERPL-MCNC: 1.61 MG/DL (ref 0.5–1)
CREAT SERPL-MCNC: 1.71 MG/DL (ref 0.5–1)
D DIMER PPP FEU-MCNC: 93 %
ERYTHROCYTE [DISTWIDTH] IN BLOOD BY AUTOMATED COUNT: 16.9 % (ref 11.6–17.2)
GFR SERPLBLD BASED ON 1.73 SQ M-ARVRAT: 29 ML/MIN (ref 89–?)
GFR SERPLBLD BASED ON 1.73 SQ M-ARVRAT: 32 ML/MIN (ref 89–?)
GFR SERPLBLD BASED ON 1.73 SQ M-ARVRAT: 32 ML/MIN (ref 89–?)
GLUCOSE PLR-MCNC: 91 MG/DL
GLUCOSE SERPL-MCNC: 100 MG/DL (ref 74–106)
GLUCOSE SERPL-MCNC: 80 MG/DL (ref 74–106)
GLUCOSE SERPL-MCNC: 81 MG/DL (ref 74–106)
HCO3 BLD-SCNC: 24.5 MEQ/L (ref 21–32)
HCO3 BLD-SCNC: 28.6 MEQ/L (ref 21–32)
HCO3 BLD-SCNC: 31.1 MEQ/L (ref 21–32)
HCT VFR BLD CALC: 26.9 % (ref 35–46)
HGB BLD-MCNC: 9.1 GM/DL (ref 11.6–15.3)
INR PPP: 1.4 RATIO
LDH PLR-CCNC: 151 U/L
LYMPHOCYTES NFR PLR: 7 %
MCH RBC QN AUTO: 31.2 PG (ref 27–34)
MCHC RBC AUTO-ENTMCNC: 33.8 % (ref 32–36)
MCV RBC AUTO: 92.3 FL (ref 80–100)
PLATELET # BLD: 108 TH/MM3 (ref 150–450)
PLEURAL FLUID RBC: 60 /MM3 (ref 0–0)
PLEURAL FLUID WBC: 50 /MM3 (ref 0–10)
PMV BLD AUTO: 8.5 FL (ref 7–11)
PROT PLR-MCNC: 3.4 GM/DL
PROT SERPL-MCNC: 6.5 GM/DL (ref 6.4–8.2)
PROTHROMBIN TIME: 14 SEC (ref 9.8–11.6)
RBC # BLD AUTO: 2.91 MIL/MM3 (ref 4–5.3)
SODIUM SERPL-SCNC: 140 MEQ/L (ref 136–145)
SODIUM SERPL-SCNC: 141 MEQ/L (ref 136–145)
SODIUM SERPL-SCNC: 141 MEQ/L (ref 136–145)
WBC # BLD AUTO: 5.4 TH/MM3 (ref 4–11)

## 2018-01-17 PROCEDURE — 0W993ZX DRAINAGE OF RIGHT PLEURAL CAVITY, PERCUTANEOUS APPROACH, DIAGNOSTIC: ICD-10-PCS | Performed by: INTERNAL MEDICINE

## 2018-01-17 RX ADMIN — POTASSIUM CHLORIDE SCH MEQ: 20 TABLET, EXTENDED RELEASE ORAL at 21:00

## 2018-01-17 RX ADMIN — POTASSIUM CHLORIDE SCH MEQ: 20 TABLET, EXTENDED RELEASE ORAL at 09:00

## 2018-01-17 RX ADMIN — POTASSIUM CHLORIDE SCH MEQ: 1.5 POWDER, FOR SOLUTION ORAL at 13:00

## 2018-01-17 RX ADMIN — ASPIRIN 81 MG SCH MG: 81 TABLET ORAL at 09:00

## 2018-01-17 RX ADMIN — DEXTROSE MONOHYDRATE SCH MLS/HR: 5 INJECTION, SOLUTION INTRAVENOUS at 14:58

## 2018-01-17 RX ADMIN — POTASSIUM CHLORIDE PRN MLS/HR: 400 INJECTION, SOLUTION INTRAVENOUS at 05:36

## 2018-01-17 RX ADMIN — POTASSIUM CHLORIDE SCH MEQ: 1.5 POWDER, FOR SOLUTION ORAL at 18:00

## 2018-01-17 RX ADMIN — BUMETANIDE SCH MLS/HR: 0.25 INJECTION INTRAMUSCULAR; INTRAVENOUS at 08:21

## 2018-01-17 RX ADMIN — PANTOPRAZOLE SODIUM SCH MG: 40 INJECTION, POWDER, FOR SOLUTION INTRAVENOUS at 21:42

## 2018-01-17 RX ADMIN — Medication SCH ML: at 21:42

## 2018-01-17 RX ADMIN — Medication SCH ML: at 09:00

## 2018-01-17 RX ADMIN — POTASSIUM CHLORIDE PRN MLS/HR: 400 INJECTION, SOLUTION INTRAVENOUS at 13:48

## 2018-01-17 RX ADMIN — CEFEPIME SCH MLS/HR: 2 INJECTION, POWDER, FOR SOLUTION INTRAVENOUS at 14:55

## 2018-01-17 RX ADMIN — POTASSIUM CHLORIDE SCH MEQ: 1.5 POWDER, FOR SOLUTION ORAL at 09:00

## 2018-01-17 RX ADMIN — DEXTROSE MONOHYDRATE SCH MLS/HR: 5 INJECTION, SOLUTION INTRAVENOUS at 05:36

## 2018-01-17 RX ADMIN — DEXTROSE MONOHYDRATE SCH MLS/HR: 5 INJECTION, SOLUTION INTRAVENOUS at 13:48

## 2018-01-17 NOTE — HHI.CCPN
Subjective


Remarks/Hospital Course


Hospital Course:





72-year-old female with a medical history significant for CHF, cardiomyopathy 

who was admitted initially at [facility] from December 25 21


And subsequently discharged.  She has been very weak since her discharge and 

has had diarrhea for several months.  She has also had nausea and poor appetite 

as well as long-standing shortness of breath.  She presented back on January 9, 2018 to [facility] ER with generalized weakness as well as some shortness 

of breath.  She had significant edema in her lower extremities.  She was 

reportedly admitted with a sepsis at [facility] in December 2017 and CHF.

  Her LVEF is noted to be 20%.  During previous hospitalization she was treated 

with IV antibiotics and pressors.  She was also treated with antibiotics at 

that time.


Patient was admitted by [facility] on January 9 and was 

evaluated by cardiology.  She was initiated on diuretics, IV albumin as well as 

milrinone gtt.  Initially she received Rocephin and Flagyl which was 

subsequently stopped.


Milrinone gtt. was titrated off at 12 noon on 1/13.  Patient developed 

worsening shortness of breath and some altered mental status and abdominal pain 

subsequently.  She remained on 2 L nasal cannula however was slightly 

tachypneic.  ABG was done for further evaluation around 6 PM which revealed 

severe metabolic acidosis with pH 7.16, PCO2 18, PO2 98 and bicarbonate 6. 

Critical care was consulted on 1/13 around 6:30 PM.  Dr. Mccall was contacted by 

ICU RN N ordered 5 amps of sodium bicarbonate IV push.  Patient's milrinone was 

resumed at 0.5 mics per KG per minute.  I evaluated the patient after being 

notified of the consult and ordered stat labs and chest x-ray.  When I 

evaluated the patient she was encephalopathic/lethargic with tachypnea.  She 

had just received 5 A of sodium bicarbonate IV push.  Stat labs are pending.





Subjective:





1/14: remains in distress. lactate still elevated, and although clearing, very 

slowly. delayed clearance suggestive of higher mortality rate. poor urine 

output despite increasing doses of aggressive diuresis. had long discussion 

with patient, and she states she knows her heart is failing. had discussion 

with Dr. Mccall, and we both agree patient will need left and right heart caths 

in the AM and get objective evidence of PVR, PCWP and cardiac filling and 

output. she may need dialysis in order to offload intravascular volume from 

right heart failure.





1/15: uop improved from overnight, now 50-60cc/hr. remains on lasix drip. NPO 

for possible LHC/RHC. Cr remains elevated, but stable: likely YOLIE from ATN from 

poor perfusion 48h ago. patient subjectively feels improved and complaining of 

less fatigue and sob.





1/16: Remains critically ill, MAP 60-61. Creat 1.68. Getting fluids per Dr. Niño in attempt to improve creat for Cath. Will place central line of 

pressor/inotrope infusion and also CVP monitoring.  ml in 24 hours





1/17: PAC placed yesterday, PA pressures 38/29 now. Last PCWP was 36. IVF 

discontinue, IV Lasix 60 mg ordered. Discussed  with Dr. Niño- agrees with 

management plan, he wants to use Dopamine at low dose. I will also start on 

Bumex gtt, with IV Albumin. Plan for bedside thoracentesis today





Objective





Vital Signs








  Date Time  Temp Pulse Resp B/P (MAP) Pulse Ox O2 Delivery O2 Flow Rate FiO2


 


1/17/18 05:36  107  105/58    


 


1/17/18 04:00     97 Nasal Cannula 4.00 


 


1/17/18 04:00 97.3  16     


 


1/13/18 07:55        21














Intake and Output   


 


 1/17/18 1/17/18 1/17/18





 07:59 15:59 23:59


 


Intake Total 290 ml  


 


Output Total 123 ml  


 


Balance 167 ml  








Result Diagram:  


1/17/18 0350                                                                   

             1/17/18 0350





Objective Remarks


HEENT/Neuro: No pallor or icterus, tongue moist, LOUISA, awake and alert. (

Slightly lethargic from Ativan yesterday)


Neck: RIJ introducer in place


Chest/pulmonary: unlabored. remains on nc o2. Diminished breath sounds at the 

bases.  Large right pleural effusion on ultrasound


Cardiovascular: Tachycardic with heart rate 110s, sinus by tele. No murmurs. 

Remains on milrinone 0.5 mcg/kg/min. PAC 38/29, CI 1.7-1.9. PCWP was 36


GI/abdomen: Soft, mildly tender to palpation diffusely. no guarding


Extremities: Warm bilaterally, bilateral edema trace now





A/P


Assessment and Plan


Assessment: 72yF with biventricular failure, severe was in cardiogenic shock 

when milrinone was weaned. now with persistently elevated lactate, now 

improving. and remains on milrinone therapy. still clinically appears volume 

overloaded, PAC placed 1/16, consistent with BiV failure. DCd IVF, start Bumex 

drip





Active problems:


Severe biventricular failure


Cor pulmonale


Cardiogenic shock


Acute hypoxic respiratory failure - improving


Bilateral pleural effusion R>L


Congestive hepatopathy


Acute kidney injury secondary to cardiogenic shock


Pulmonary hypertension


Nonischemic cardiomyopathy with LVEF 20%


Metabolic acidosis: Suspect low flow state causing lactic acidosis with 

probable bowel ischemia


Diarrhea - improving.


Abdominal pain


Tachycardia


Thrombocytopenia





Plan:





RHC with PCWP 36, CI 1.7.  Continue milrinone.  Start Bumex bolus and infusion 

at 1 mg per hour


Monitor BMP every 8 hours with potassium supplements


Dr. Niño is starting dopamine-watch for tachycardia


Plan for right thoracentesis today


serial LFTs, BMP, CBC


trend lactates, remains elevated


watch uop, requires herrera


wean o2 by nc for goal spo2 > 92%


Lomotil for diarrhea: likely this is from ischemia, but patient says it gives 

her symptomatic relief, and she specifically requests it.


HIT negative -Restart Lovenox or sq hepain in 24 hours





Remain in ICU. critically ill. off pathway.





CCT 42 MIN





Discussed extensively with Dr. Niño.











Carlos Arciniega MD Jan 17, 2018 07:54

## 2018-01-17 NOTE — PD.PROCEDR
Procedure Note


Procedure


US guided right Thoracentesis





 Indication: Large right pleural effusion





A time-out was completed verifying correct patient, procedure, site, positioning

, and special equipment. The patients right side was prepped and draped in a 

sterile manner after the appropriate infiltration level was confirmed by 

ultrasound. 1% lidocaine was used anesthetize the surrounding skin. A finder 

needle was then used to locate fluid and clear yellow fluid was obtained. A 10-

blade scalpel used to make the incision. The thoracentesis catheter was then 

threaded without difficulty. The patient had 1600 ml (1.6L) of clear yellow 

fluid removed. A post-procedure chest x-ray was ordered and the fluid will be 

sent for several studies.





Estimated Blood Loss: <1 ml


The patient tolerated the procedure well and there were no complications.











Carlos Arciniega MD Jan 17, 2018 12:41

## 2018-01-17 NOTE — RADRPT
EXAM DATE/TIME:  01/17/2018 13:17 

 

[facility] COMPARISON:     

CHEST SINGLE AP, January 17, 2018, 6:40.

 

                     

INDICATIONS :     

Post right thoracentesis.

                     

 

MEDICAL HISTORY :     

Hypertension.          

 

SURGICAL HISTORY :     

None.   

 

ENCOUNTER:     

Subsequent                                        

 

ACUITY:     

1 day      

 

PAIN SCORE:     

0/10

 

LOCATION:     

Right chest 

 

FINDINGS:     

There is been interval removal of a large right-sided effusion. There is no pneumothorax. Areas conso
lidation or infiltrate in the right lower lobe. There is a small effusion on the left.

 

There is a Grafton-Farhan catheter in place

 

The heart is mildly enlarged.

 

CONCLUSION:     

1. No pneumothorax identified following right thoracentesis.

2. Consolidation/atelectasis or pneumonia the right lung base.

3. Small left basilar effusion.

4. Cardiomegaly.

 

 

 

 Lalo Monroy MD on January 17, 2018 at 13:59           

Board Certified Radiologist.

 This report was verified electronically.

## 2018-01-17 NOTE — HHI.HCPN
Reason for visit


   a.  To assist with evaluation and management of symptoms including:  Dyspnea


   b.  To assist medical decision maker(s) with: better understanding of 

current medical conditions; weighing benefits/burdens of medical treatment 

options; making        


        medical treatment decisions.


.





Subjective/Interval History


INTERVAL NOTE:


The patient seems to be declining significantly.  She appears weaker, is 

lethargic now (ever since the Ativan and the procedure yesterday afternoon), 

her blood pressure is now in the 70s systolic, and her oxygen saturation is 

down in the 80s in spite of the mask.





Her creatinine is 1.71.





She underwent a thoracentesis with removal of 1700 cc earlier today.








.


Family/friend interactions


Lengthy discussion with patient's son Angel (her designated health care 

surrogate) and his wife.  They also see the significant decline, and they tell 

me that they also are less optimistic about her surviving this.  They relate 

repeated conversations they had had with her in recent years, and tell me that 

they are certain that she would not want to be intubated and placed on a 

mechanical ventilator.  They know that she would not want to end up requiring 24

-hour nursing care.  Angel specifically requests no intubation at this time.


.





Advance Directives


Living Will:  Copy in medical record


Health Care Surrogate:  Copy in medical record


Durable Power of :  Never completed


Advance Directive Specifics


Health Care Surrogate(s):


Primary healthcare surrogate his son Angel, secondary is son Kenyon


.


Documented care wishes:


The patient has a living will


.


Significant change in goals:


No intubation


.





Objective





Vital Signs








  Date Time  Temp Pulse Resp B/P (MAP) Pulse Ox O2 Delivery O2 Flow Rate FiO2


 


1/17/18 13:48  125  81/57    


 


1/17/18 11:00 97.7 126 15 93/60 (71) 94   


 


1/17/18 11:00  126      


 


1/17/18 11:00     94 Nasal Cannula 5.00 


 


1/17/18 08:51  115  90/64    


 


1/17/18 08:42     98 Nasal Cannula 3.00 


 


1/17/18 08:00  115  90/64 (73)    


 


1/17/18 07:00     98 Nasal Cannula 4.00 


 


1/17/18 07:00 97.9 115 16 92/66 (75) 98   


 


1/17/18 07:00  115      


 


1/17/18 05:36  107  105/58    


 


1/17/18 04:00  108      


 


1/17/18 04:00     97 Nasal Cannula 4.00 


 


1/17/18 04:00 97.3 109 16 96/60 (72) 97   


 


1/17/18 00:00  110      


 


1/17/18 00:00 97.4 109 18 111/49 (69) 99   


 


1/17/18 00:00     99 Nasal Cannula 4.00 


 


1/16/18 23:00  113  103/55 (71)    


 


1/16/18 20:00 97.8 114 20 98/70 (79) 99   


 


1/16/18 20:00     99 Nasal Cannula 4.00 


 


1/16/18 20:00  114  98/70 (79)    


 


1/16/18 20:00  114      


 


1/16/18 19:30     98 Nasal Cannula 3.00 


 


1/16/18 18:00  112  99/70 (80)    


 


1/16/18 16:14  109  117/63    


 


1/16/18 15:23  112 25 98/60 90   


 


1/16/18 15:03  119 20 103/73 94   


 


1/16/18 15:00     94 Nasal Cannula 4.00 





      Humidified  


 


1/16/18 15:00 98.8 119 18 103/73 (83) 94   


 


1/16/18 15:00  119      


 


1/16/18 14:43 98.6 118 20 87/58 93   














Intake & Output  


 


 1/17/18 1/17/18





 07:00 19:00


 


Intake Total 390 ml 100 ml


 


Output Total 123 ml 


 


Balance 267 ml 100 ml


 


  


 


Intake Oral 0 ml 


 


IV Total 390 ml 100 ml


 


Output Urine Total 123 ml 


 


# Bowel Movements 1 








Physical Exam


CONSTITUTIONAL/GENERAL: She is weaker, more pale, more lethargic.


TUBES/LINES/DRAINS: Nasal O2, IV access, Mckeon


SKIN: No jaundice, rashes, or lesions. Ecchymoses on upper extremities. No 

wounds seen anteriorly. Skin temperature appropriate. Not diaphoretic. 


ENT: Hearing grossly normal. Nose without bleeding or purulent drainage.


NECK: Trachea midline. Supple, nontender. No palpable thyroid enlargement or 

nodularity. 


CARDIOVASCULAR: Regular rate and rhythm with grade 1 systolic murmur. 


RESPIRATORY/CHEST: Symmetric, unlabored respirations. Breath sounds equal 

bilaterally.  Some scattered rales


GASTROINTESTINAL: Abdomen soft, non-tender, nondistended. No hepato-splenomegaly

, or palpable masses. No guarding. Bowel sounds present.


GENITOURINARY: Without palpable bladder distension.  Mckeon catheter is present


NEUROLOGICAL: Very weak, lethargic now. Moves all extremities.


PSYCHIATRIC: No obvious anxiety/depression. no apparent hallucinations or other 

psychotic thought process.


.





Diagnostic Tests


Laboratory





Laboratory Tests








Test


  1/15/18


04:23 1/15/18


09:52 1/15/18


14:15 1/16/18


04:35


 


White Blood Count


  7.2 TH/MM3


(4.0-11.0) 


  


  6.9 TH/MM3


(4.0-11.0)


 


Red Blood Count


  3.41 MIL/MM3


(4.00-5.30) 


  


  3.28 MIL/MM3


(4.00-5.30)


 


Hemoglobin


  10.5 GM/DL


(11.6-15.3) 


  


  9.9 GM/DL


(11.6-15.3)


 


Hematocrit


  31.0 %


(35.0-46.0) 


  


  30.2 %


(35.0-46.0)


 


Mean Corpuscular Volume


  90.7 FL


(80.0-100.0) 


  


  92.1 FL


(80.0-100.0)


 


Mean Corpuscular Hemoglobin


  30.7 PG


(27.0-34.0) 


  


  30.3 PG


(27.0-34.0)


 


Mean Corpuscular Hemoglobin


Concent 33.9 %


(32.0-36.0) 


  


  32.9 %


(32.0-36.0)


 


Red Cell Distribution Width


  17.9 %


(11.6-17.2) 


  


  17.8 %


(11.6-17.2)


 


Platelet Count


  52 TH/MM3


(150-450) 


  


  47 TH/MM3


(150-450)


 


Mean Platelet Volume


  10.1 FL


(7.0-11.0) 


  


  10.4 FL


(7.0-11.0)


 


Prothrombin Time


  16.1 SEC


(9.8-11.6) 


  


  15.6 SEC


(9.8-11.6)


 


Prothromb Time International


Ratio 1.6 RATIO 


  


  


  1.5 RATIO 


 


 


Activated Partial


Thromboplast Time 46.4 SEC


(24.3-30.1) 


  


  35.7 SEC


(24.3-30.1)


 


Blood Urea Nitrogen


  31 MG/DL


(7-18) 


  


  34 MG/DL


(7-18)


 


Creatinine


  1.67 MG/DL


(0.50-1.00) 


  


  1.68 MG/DL


(0.50-1.00)


 


Random Glucose


  85 MG/DL


() 


  


  75 MG/DL


()


 


Total Protein


  6.3 GM/DL


(6.4-8.2) 


  


  6.1 GM/DL


(6.4-8.2)


 


Albumin


  4.1 GM/DL


(3.4-5.0) 


  


  3.8 GM/DL


(3.4-5.0)


 


Calcium Level


  8.6 MG/DL


(8.5-10.1) 


  


  8.5 MG/DL


(8.5-10.1)


 


Phosphorus Level


  1.5 MG/DL


(2.5-4.9) 


  


  2.4 MG/DL


(2.5-4.9)


 


Alkaline Phosphatase


  64 U/L


() 


  


  72 U/L


()


 


Aspartate Amino Transf


(AST/SGOT) 93 U/L (15-37) 


  


  


  70 U/L (15-37) 


 


 


Alanine Aminotransferase


(ALT/SGPT) 32 U/L (10-53) 


  


  


  30 U/L (10-53) 


 


 


Total Bilirubin


  1.7 MG/DL


(0.2-1.0) 


  


  1.2 MG/DL


(0.2-1.0)


 


Sodium Level


  141 MEQ/L


(136-145) 


  


  139 MEQ/L


(136-145)


 


Potassium Level


  3.6 MEQ/L


(3.5-5.1) 


  


  3.3 MEQ/L


(3.5-5.1)


 


Chloride Level


  97 MEQ/L


() 


  


  97 MEQ/L


()


 


Carbon Dioxide Level


  30.0 MEQ/L


(21.0-32.0) 


  


  29.6 MEQ/L


(21.0-32.0)


 


Anion Gap


  14 MEQ/L


(5-15) 


  


  12 MEQ/L


(5-15)


 


Estimat Glomerular Filtration


Rate 30 ML/MIN


(>89) 


  


  30 ML/MIN


(>89)


 


Lactic Acid Level


  3.5 mmol/L


(0.4-2.0) 


  


  3.5 mmol/L


(0.4-2.0)


 


Magnesium Level


  1.7 MG/DL


(1.5-2.5) 


  


  


 


 


Digoxin Level


  1.6 NG/ML


(0.8-2.0) 


  


  


 


 


Serotonin Release Assay


  


  NEGATIVE


(NEGATIVE) 


  


 


 


Heparin-Induced Platelet Ab


([facility]) 


  NEGATIVE


(NEGATIVE) 


  


 


 


MIGUEL UF Heparin Low Dose 0.1


IU/mL 


  0 (2.1-21.7) 


  


  


 


 


MIGUEL UF Heparin Low Dose 0.5


IU/mL 


  0 (2.1-21.7) 


  


  


 


 


MIGUEL UF Heparin High Dose 100


IU/mL 


  0 (2.1-21.7) 


  


  


 


 


HIPA Patient Optical Density


  


  0.125 O.D.


(0.000-0.300) 


  


 


 


Nasal Screen MRSA (PCR)


  


  


  NEGATIVE


(NEGATIVE) 


 


 


Staphylococcus aureus


(PCR)(LAB) 


  


  NEGATIVE


(NEGATIVE) 


 


 


B-Type Natriuretic Peptide


  


  


  


  GREATER THAN


5000 PG/ML


 


Test


  1/17/18


03:50 1/17/18


13:25 1/17/18


13:52 


 


 


White Blood Count


  5.4 TH/MM3


(4.0-11.0) 


  


  


 


 


Red Blood Count


  2.91 MIL/MM3


(4.00-5.30) 


  


  


 


 


Hemoglobin


  9.1 GM/DL


(11.6-15.3) 


  


  


 


 


Hematocrit


  26.9 %


(35.0-46.0) 


  


  


 


 


Mean Corpuscular Volume


  92.3 FL


(80.0-100.0) 


  


  


 


 


Mean Corpuscular Hemoglobin


  31.2 PG


(27.0-34.0) 


  


  


 


 


Mean Corpuscular Hemoglobin


Concent 33.8 %


(32.0-36.0) 


  


  


 


 


Red Cell Distribution Width


  16.9 %


(11.6-17.2) 


  


  


 


 


Platelet Count


  108 TH/MM3


(150-450) 


  


  


 


 


Mean Platelet Volume


  8.5 FL


(7.0-11.0) 


  


  


 


 


Prothrombin Time


  14.0 SEC


(9.8-11.6) 


  


  


 


 


Prothromb Time International


Ratio 1.4 RATIO 


  


  


  


 


 


Activated Partial


Thromboplast Time 35.3 SEC


(24.3-30.1) 


  


  


 


 


Blood Urea Nitrogen


  34 MG/DL


(7-18) 


  


  


 


 


Creatinine


  1.71 MG/DL


(0.50-1.00) 


  


  


 


 


Random Glucose


  80 MG/DL


() 


  


  


 


 


Total Protein


  6.5 GM/DL


(6.4-8.2) 


  


  


 


 


Albumin


  4.1 GM/DL


(3.4-5.0) 


  


  


 


 


Calcium Level


  8.4 MG/DL


(8.5-10.1) 


  


  


 


 


Alkaline Phosphatase


  68 U/L


() 


  


  


 


 


Aspartate Amino Transf


(AST/SGOT) 43 U/L (15-37) 


  


  


  


 


 


Alanine Aminotransferase


(ALT/SGPT) 24 U/L (10-53) 


  


  


  


 


 


Total Bilirubin


  1.1 MG/DL


(0.2-1.0) 


  


  


 


 


Sodium Level


  140 MEQ/L


(136-145) 


  


  


 


 


Potassium Level


  3.0 MEQ/L


(3.5-5.1) 


  


  


 


 


Chloride Level


  98 MEQ/L


() 


  


  


 


 


Carbon Dioxide Level


  31.1 MEQ/L


(21.0-32.0) 


  


  


 


 


Anion Gap


  11 MEQ/L


(5-15) 


  


  


 


 


Estimat Glomerular Filtration


Rate 29 ML/MIN


(>89) 


  


  


 


 


Lactic Acid Level


  2.4 mmol/L


(0.4-2.0) 


  


  


 


 


Pleural Fluid Total Protein  3.4 GM/DL   


 


Pleural Fluid LDH  151 U/L   


 


Pleural Fluid Glucose  91 MG/DL   


 


Blood Gas Puncture Site   RT RADIAL  


 


Blood Gas Patient Temperature   98.6  


 


Blood Gas HCO3


  


  


  25 mmol/L


(22-26) 


 


 


Blood Gas Base Excess


  


  


  0.9 mmol/L


(-2-2) 


 


 


Blood Gas Oxygen Saturation   80 % ()  


 


Arterial Blood pH


  


  


  7.40


(7.380-7.420) 


 


 


Arterial Blood Partial


Pressure CO2 


  


  42 mmHg


(38-42) 


 


 


Arterial Blood Partial


Pressure O2 


  


  49 mmHg


() 


 


 


Arterial Blood Oxygen Content


  


  


  12.2 Vol %


(12.0-20.0) 


 


 


Arterial Blood


Carboxyhemoglobin 


  


  1.2 % (0-4) 


  


 


 


Arterial Blood Methemoglobin   1.2 % (0-2)  


 


Blood Gas Hemoglobin


  


  


  10.9 G/DL


(12.0-16.0) 


 


 


Oxygen Delivery Device   Venti Mask  


 


Blood Gas Liter Flow   6 L/M  


 


Blood Gas Inspired Oxygen   50 %  








Result Diagram:  


1/17/18 0350                                                                   

             1/17/18 0350





Microbiology





Microbiology








 Date/Time


Source Procedure


Growth Status


 


 


 1/17/18 13:25


Fluid Pleural Fluid Fungal Smear


Pending Received


 


 1/17/18 13:25


Fluid Pleural Fluid Fungal Culture


Pending Received





 1/17/18 13:25


Fluid Pleural Fluid Acid Fast Stain


Pending Received


 


 1/17/18 13:25


Fluid Pleural Fluid Mycobacterial Culture


Pending Received





 1/17/18 13:25


Fluid Pleural Fluid Gram Stain


Pending Received


 


 1/17/18 13:25


Fluid Pleural Fluid Body Fluid Culture


Pending Received








Imaging





Last Impressions








Chest X-Ray 1/17/18 0000 Signed





Impressions: 





 Service Date/Time:  Wednesday, January 17, 2018 13:17 - CONCLUSION:  1. No 





 pneumothorax identified following right thoracentesis. 2. 





 Consolidation/atelectasis or pneumonia the right lung base. 3. Small left 





 basilar effusion. 4. Cardiomegaly.     Lalo Monroy MD 


 


Abdomen/Pelvis CT 1/9/18 0000 Signed





Impressions: 





 Service Date/Time:  Tuesday, January 9, 2018 21:30 - CONCLUSION:  1. No 





 obstruction or acute inflammatory changes are seen in the abdomen or pelvis. 

2. 





 Apparent complex cystic and solid mass of the mid zone of the right kidney. A 





 followup MRI of the abdomen is recommended with and without contrast in 





 approximately 3 months. This may be helpful in further characterizing a small, 





 probably benign but nonspecific hypodensity of the liver. 3. Nonspecific right 





 greater than left pleural effusions and basilar atelectasis. There is an area 

of 





 nonenhancing right lower lobe pulmonary parenchyma compatible with pneumonia. 





 Infection and post obstructive process would be in the differential. After 





 treatment for presumed pneumonia, a followup CT of the chest, preferably with 





 intravenous contrast, is recommended within the next couple of months, sooner 

if 





 felt clinically indicated.  4. Also a nonspecific moderate size pericardial 





 effusion.     Demian Alegria MD 











Assessment and Plan


Disease Oriented Problem List:  


(1) cardiomyopathy


(2) congestive failure with pronounced right side failure symptoms


(3) low flow state, with some hepatic, renal, and GI effects


(4) CHF, first diagnosed October 2017


(5) right kidney cyst on CT scan, possibly complex


(6) colitis, possible ischemic


(7) GERD


(8) history of hypertension


(9) history of hyperthyroid


(10) peripheral neuropathy


(11) history of B12 deficiency


(12) history of avascular necrosis left hip 2003


Symptom Scale:  


(1) dyspnea


0-10 Scale:  1





(2) weakness


0-10 Scale:  6





Pertinent Non-Medical Issues


Psychosocial:  , 3 sons living locally, retired principal of [employer]


Spiritual:  Spirituality and her kimberly has been very important to her.  She has 

a [employer] background, but has been [employer] for many years, currently being 

supported by her .


Legal:  The patient has capacity for decision-making.  She has designated her 

sons Angel and Kenyon as primary and secondary HCS respectively.


Ethical issues impacting care: None


.


Important Contacts


Son: Angel Garcia [phone]


Son: Kenyon Garcia [phone]


.


Prognosis


Overall, her prognosis is quite guarded.  She has significant cardiomyopathy 

with low flow state.  She will be appropriate for hospice services if the 

upcoming studies confirm end-stage disease, and if the goals become solely 

comfort oriented.


.


Code Status:  Alternative Code (no intubation)


Plan


* ALTERNATE CODE: No intubation


* DECISION-MAKING:  The patient now does not have capacity for decision-making.

  She has designated her sons Angel and Kenyon as primary and secondary HCS 

respectively.


* GOALS:  The family is talking with the nurse about bringing grandchildren up 

to see the patient if she does become a little more alert and could interact 

with them.  They recognize that she is likely to not survive this 

hospitalization, and they are open to hospice services if she continues to 

decline..


* SYMPTOMS: The patient's dyspnea and hypotension is significant at times.  I 

have no additional medication recommendations at this time.


* Palliative Care will continue to follow the patient during this 

hospitalization.


.





Time Spent


Total Floor Time (mins):  43


Face to Face Time (mins):  21


>50% Counseling/Coord of Care:  Yes (d/w RN)





Attestation


To help prompt me to consider important information that might be impacting 

today's encounter and assessment, information from prior notes written by 

myself or my colleagues may have been "brought forward" into today's note.  My 

signature on this note, however, is an attestation that I personally performed 

the exam, history, and/or decision-making noted today, and, unless otherwise 

indicated, the interactions with patient, family, and staff as well as the 

review of records all occurred today.  I also attest that the listed assessment 

and stated plan reflect my best clinical judgment today based on the 

combination of historical information, prior notes, and today's exam/ 

interactions.  When time spent is documented, it refers only to time spent 

today by the signer, or if indicated, combined time spent today by 

collaborating physician/nurse practitioner.











Nina Garcia MD Jan 17, 2018 14:21

## 2018-01-17 NOTE — RADRPT
EXAM DATE/TIME:  01/17/2018 06:40 

 

[facility] COMPARISON:     

CHEST SINGLE AP, January 16, 2018, 18:34.

 

                     

INDICATIONS :     

Shortness of breath.

                     

 

MEDICAL HISTORY :     

Hypertension.          

 

SURGICAL HISTORY :     

None.   

 

ENCOUNTER:     

Subsequent                                        

 

ACUITY:     

3 days      

 

PAIN SCORE:     

Non-responsive.

 

LOCATION:     

Bilateral chest 

 

FINDINGS:     

The heart size is normal. There is increased density seen throughout much of the right chest related 
to at least a moderate to large effusion. There is a [place]-Farhan catheter placed from the right interna
l jugular approach with the tip overlying the right pulmonary artery. There is increased density at t
he left base with silhouetting the left hemidiaphragm. The left upper and midlung are clear..

 

CONCLUSION:     

1. Moderate to large or pleural effusion.

2. Increased density left base which silhouettes the left hemidiaphragm related to some degree of ate
lectasis, consolidation, and/or mild effusion.

3. [place]-Farhan catheter in good position.

 

 

 

 Demian Ludwig MD on January 17, 2018 at 6:53           

Board Certified Radiologist.

 This report was verified electronically.

## 2018-01-18 VITALS
TEMPERATURE: 99 F | HEART RATE: 114 BPM | RESPIRATION RATE: 30 BRPM | DIASTOLIC BLOOD PRESSURE: 36 MMHG | SYSTOLIC BLOOD PRESSURE: 57 MMHG

## 2018-01-18 VITALS
SYSTOLIC BLOOD PRESSURE: 100 MMHG | DIASTOLIC BLOOD PRESSURE: 72 MMHG | OXYGEN SATURATION: 100 % | TEMPERATURE: 98.6 F | HEART RATE: 121 BPM | RESPIRATION RATE: 32 BRPM

## 2018-01-18 VITALS
DIASTOLIC BLOOD PRESSURE: 39 MMHG | TEMPERATURE: 96.5 F | HEART RATE: 121 BPM | SYSTOLIC BLOOD PRESSURE: 67 MMHG | RESPIRATION RATE: 10 BRPM

## 2018-01-18 VITALS — SYSTOLIC BLOOD PRESSURE: 84 MMHG | DIASTOLIC BLOOD PRESSURE: 53 MMHG | HEART RATE: 119 BPM

## 2018-01-18 VITALS
TEMPERATURE: 96 F | DIASTOLIC BLOOD PRESSURE: 54 MMHG | SYSTOLIC BLOOD PRESSURE: 85 MMHG | HEART RATE: 129 BPM | RESPIRATION RATE: 26 BRPM | OXYGEN SATURATION: 94 %

## 2018-01-18 VITALS
DIASTOLIC BLOOD PRESSURE: 46 MMHG | TEMPERATURE: 95 F | RESPIRATION RATE: 25 BRPM | HEART RATE: 125 BPM | SYSTOLIC BLOOD PRESSURE: 101 MMHG | OXYGEN SATURATION: 95 %

## 2018-01-18 VITALS — SYSTOLIC BLOOD PRESSURE: 84 MMHG | HEART RATE: 119 BPM | DIASTOLIC BLOOD PRESSURE: 53 MMHG

## 2018-01-18 VITALS — HEART RATE: 131 BPM

## 2018-01-18 VITALS — OXYGEN SATURATION: 93 %

## 2018-01-18 VITALS
TEMPERATURE: 95.5 F | DIASTOLIC BLOOD PRESSURE: 54 MMHG | HEART RATE: 129 BPM | RESPIRATION RATE: 30 BRPM | SYSTOLIC BLOOD PRESSURE: 82 MMHG

## 2018-01-18 VITALS — HEART RATE: 120 BPM

## 2018-01-18 VITALS — OXYGEN SATURATION: 100 %

## 2018-01-18 LAB
ALBUMIN SERPL-MCNC: 5.1 GM/DL (ref 3.4–5)
ALP SERPL-CCNC: 66 U/L (ref 45–117)
ALT SERPL-CCNC: 20 U/L (ref 10–53)
AMYLASE BODY FLUID TYPE: (no result)
AMYLASE FLD-CCNC: 19 U/L
AST SERPL-CCNC: 47 U/L (ref 15–37)
BASOPHILS # BLD AUTO: 0 TH/MM3 (ref 0–0.2)
BASOPHILS NFR BLD: 0.2 % (ref 0–2)
BILIRUB SERPL-MCNC: 2.3 MG/DL (ref 0.2–1)
BUN SERPL-MCNC: 38 MG/DL (ref 7–18)
CALCIUM SERPL-MCNC: 9.1 MG/DL (ref 8.5–10.1)
CHLORIDE SERPL-SCNC: 101 MEQ/L (ref 98–107)
CREAT SERPL-MCNC: 2.29 MG/DL (ref 0.5–1)
EOSINOPHIL # BLD: 0 TH/MM3 (ref 0–0.4)
EOSINOPHIL NFR BLD: 0.1 % (ref 0–4)
ERYTHROCYTE [DISTWIDTH] IN BLOOD BY AUTOMATED COUNT: 18.8 % (ref 11.6–17.2)
GFR SERPLBLD BASED ON 1.73 SQ M-ARVRAT: 21 ML/MIN (ref 89–?)
GLUCOSE SERPL-MCNC: 11 MG/DL (ref 74–106)
HCO3 BLD-SCNC: 7.5 MEQ/L (ref 21–32)
HCT VFR BLD CALC: 36.1 % (ref 35–46)
HGB BLD-MCNC: 11 GM/DL (ref 11.6–15.3)
INR PPP: 2.1 RATIO
LYMPHOCYTES # BLD AUTO: 0.6 TH/MM3 (ref 1–4.8)
LYMPHOCYTES NFR BLD AUTO: 5.9 % (ref 9–44)
LYMPHOCYTES: 6 % (ref 9–44)
MCH RBC QN AUTO: 31 PG (ref 27–34)
MCHC RBC AUTO-ENTMCNC: 30.5 % (ref 32–36)
MCV RBC AUTO: 101.7 FL (ref 80–100)
MONOCYTE #: 0.6 TH/MM3 (ref 0–0.9)
MONOCYTES NFR BLD: 5.8 % (ref 0–8)
MONOCYTES: 4 % (ref 0–8)
NEUTROPHILS # BLD AUTO: 9.1 TH/MM3 (ref 1.8–7.7)
NEUTROPHILS NFR BLD AUTO: 88 % (ref 16–70)
NEUTS BAND # BLD MANUAL: 9.4 TH/MM3 (ref 1.8–7.7)
NEUTS BAND NFR BLD: 3 % (ref 0–6)
NEUTS SEG NFR BLD MANUAL: 87 % (ref 16–70)
NUCLEATED RED BLOOD CELL: 24 (ref 0–0)
PLATELET # BLD: 109 TH/MM3 (ref 150–450)
PMV BLD AUTO: 9.2 FL (ref 7–11)
POLYCHROMASIA BLD QL SMEAR: 2.6 % (ref 0–1.9)
PROT SERPL-MCNC: 7.4 GM/DL (ref 6.4–8.2)
PROTHROMBIN TIME: 20.8 SEC (ref 9.8–11.6)
RBC # BLD AUTO: 3.55 MIL/MM3 (ref 4–5.3)
SODIUM SERPL-SCNC: 141 MEQ/L (ref 136–145)
WBC # BLD AUTO: 10.4 TH/MM3 (ref 4–11)
WBC NRBC COR # BLD: 24 /100 WBC (ref 0–0)

## 2018-01-18 RX ADMIN — ALBUMIN HUMAN SCH MLS/HR: 0.25 SOLUTION INTRAVENOUS at 06:36

## 2018-01-18 RX ADMIN — ASPIRIN 81 MG SCH MG: 81 TABLET ORAL at 09:00

## 2018-01-18 RX ADMIN — DEXTROSE MONOHYDRATE SCH MLS/HR: 5 INJECTION, SOLUTION INTRAVENOUS at 06:07

## 2018-01-18 RX ADMIN — POTASSIUM CHLORIDE SCH MEQ: 1.5 POWDER, FOR SOLUTION ORAL at 09:00

## 2018-01-18 RX ADMIN — DEXTROSE MONOHYDRATE SCH MLS/HR: 5 INJECTION, SOLUTION INTRAVENOUS at 02:53

## 2018-01-18 RX ADMIN — BUMETANIDE SCH MLS/HR: 0.25 INJECTION INTRAMUSCULAR; INTRAVENOUS at 07:29

## 2018-01-18 RX ADMIN — CEFEPIME SCH MLS/HR: 2 INJECTION, POWDER, FOR SOLUTION INTRAVENOUS at 02:54

## 2018-01-18 RX ADMIN — POTASSIUM CHLORIDE PRN MLS/HR: 400 INJECTION, SOLUTION INTRAVENOUS at 04:40

## 2018-01-18 RX ADMIN — POTASSIUM CHLORIDE SCH MEQ: 20 TABLET, EXTENDED RELEASE ORAL at 09:00

## 2018-01-18 RX ADMIN — POTASSIUM CHLORIDE PRN MLS/HR: 400 INJECTION, SOLUTION INTRAVENOUS at 02:27

## 2018-01-18 RX ADMIN — Medication SCH ML: at 09:00

## 2018-01-18 NOTE — RADRPT
EXAM DATE/TIME:  01/18/2018 03:34 

 

[facility] COMPARISON:     

CHEST SINGLE AP, January 17, 2018, 13:17.

 

                     

INDICATIONS :     

Shortness of breath, possible pneumothorax.

                     

 

MEDICAL HISTORY :     

Hypertension.          

 

SURGICAL HISTORY :        

Thoracentesis

 

ENCOUNTER:     

Subsequent                                        

 

ACUITY:     

1 week      

 

PAIN SCORE:     

0/10

 

LOCATION:     

Bilateral chest 

 

FINDINGS:     

There is a [place]-Farhan catheter in place from the right internal jugular approach with the tip in the r
ight pulmonary artery. The heart size is enlarged. There is worsening increased density at the right 
base. Left lung is grossly clear. There is a right effusion.

 

CONCLUSION:     

Worsening consolidation at the right mid and lower lung with a at least mild right pleural effusion. 
No pneumothorax is seen.

 

 

 

 Demian Ludwig MD on January 18, 2018 at 4:50           

Board Certified Radiologist.

 This report was verified electronically.

## 2018-01-18 NOTE — PD.CARD.PN
Subjective


Subjective Remarks


Breathing labored, on BiPAP.  BP low, on vasopressin and dopamine.  RN and 

family at bedside.  Lower extremity edema improved, on Bumex GTT.





Objective


Medications





Current Medications








 Medications


  (Trade)  Dose


 Ordered  Sig/Sulaiman


 Route  Start Time


 Stop Time Status Last Admin


 


  (NS Flush)  2 ml  BID


 IV FLUSH  1/10/18 09:00


    1/17/18 21:42


 


 


  (NS Flush)  2 ml  UNSCH  PRN


 IV FLUSH  1/9/18 22:15


     


 


 


  (Aspirin Chew)  81 mg  DAILY


 CHEW  1/10/18 09:00


    1/16/18 09:05


 


 


  (Zofran Inj)  4 mg  Q6HR  PRN


 IV PUSH  1/9/18 22:30


    1/15/18 15:04


 


 


 Albumin Human  100 ml @ 


 60 mls/hr  BID@0830,1730


 IV  1/11/18 08:30


   Future Hold 1/18/18 06:36


 


 


  (KCl Powder)  20 meq  TID


 PO  1/11/18 18:00


    1/16/18 13:04


 


 


 Milrinone Lactate


 20 mg/Sodium


 Chloride  100 ml @ 


 8.62 mls/hr  [id number]


 IV  1/13/18 18:00


    1/18/18 02:53


 


 


  (Protonix Inj)  40 mg  Q24H


 IV PUSH  1/13/18 20:00


    1/17/18 21:42


 


 


  (Lovenox Inj)  30 mg  Q24H


 SQ  1/14/18 20:00


   Future Hold 1/14/18 20:26


 


 


  (Imodium)  2 mg  Q4H  PRN


 PO  1/14/18 16:30


    1/15/18 22:18


 


 


 Potassium Chloride  100 ml @ 


 50 mls/hr  Q2H  PRN


 IV  1/15/18 09:00


    1/18/18 04:40


 


 


 Potassium Chloride  100 ml @ 


 50 mls/hr  Q2H  PRN


 IV  1/15/18 09:00


     


 


 


  (K-Lyte Cl  Eff)  50 meq  UNSCH  PRN


 PO  1/15/18 09:00


     


 


 


 Potassium Chloride  100 ml @ 


 25 mls/hr  UNSCH  PRN


 IV  1/15/18 09:00


     


 


 


 Potassium Chloride  100 ml @ 


 50 mls/hr  Q2H  PRN


 IV  1/15/18 09:00


     


 


 


 Magnesium Sulfate


 4 gm/Sodium


 Chloride  100 ml @ 


 50 mls/hr  UNSCH  PRN


 IV  1/15/18 09:00


     


 


 


  (Mag-Ox)  800 mg  UNSCH  PRN


 PO  1/15/18 09:00


     


 


 


 Magnesium Sulfate


 2 gm/Sodium


 Chloride  100 ml @ 


 50 mls/hr  UNSCH  PRN


 IV  1/15/18 09:00


     


 


 


  (K-Phos)  2,000 mg  Q4H  PRN


 PO  1/15/18 09:00


    1/15/18 09:57


 


 


 Sodium Phosphate


 30 mmol/Sodium


 Chloride  250 ml @ 


 42 mls/hr  UNSCH  PRN


 IV  1/15/18 09:00


     


 


 


  (K-Phos)  2,000 mg  UNSCH  PRN


 PO/TUBE  1/15/18 09:00


     


 


 


 Potassium


 Phosphate 30 mmol/


 Sodium Chloride  260 ml @ 


 42 mls/hr  UNSCH  PRN


 IV  1/15/18 09:00


     


 


 


  (Brethine Inj)  1 mg  UNSCH  PRN


 SQ  1/17/18 07:30


     


 


 


  (KCl)  20 meq  Q12HR


 PO  1/17/18 09:00


     


 


 


 Bumetanide  100 ml @ 4


 mls/hr  Q24H


 IV  1/17/18 08:15


    1/18/18 07:29


 


 


 Cefepime HCl 2000


 mg/Sodium Chloride  100 ml @ 


 200 mls/hr  Q12H


 IV  1/17/18 15:00


    1/18/18 02:54


 


 


 Vasopressin 40


 units/Dextrose  100 ml @ 6


 mls/hr  [id number]


 IV  1/17/18 14:03


    1/18/18 06:07


 


 


  (Brethine Inj)  1 mg  UNSCH  PRN


 SQ  1/17/18 14:15


     


 


 


 Dopamine HCl/


 Dextrose  500 ml @ 


 4.425 mls/


 hr  TITRATE  PRN


 IV  1/18/18 06:15


    1/18/18 06:31


 








Vital Signs / I&O





Vital Signs








  Date Time  Temp Pulse Resp B/P (MAP) Pulse Ox O2 Delivery O2 Flow Rate FiO2


 


1/18/18 07:24     93   40


 


1/18/18 07:00 95.0 115 25 101/46 (64) 95   


 


1/18/18 06:31  113  77/59    


 


1/18/18 06:07  114  84/56    


 


1/18/18 06:00  119  84/53 (63)    


 


1/18/18 05:00  119  84/53 (63)    


 


1/18/18 04:00     97   40


 


1/18/18 04:00 96.0 129 26 85/54 (64) 94   


 


1/18/18 03:33  131      


 


1/18/18 03:32     97 Bi-Pap  40


 


1/18/18 02:53  129  110/55    


 


1/18/18 00:30  131      


 


1/18/18 00:24     100   40


 


1/18/18 00:00     97 Bi-Pap  40


 


1/18/18 00:00 98.6 121 32 100/72 (81) 100   


 


1/17/18 20:38     99   50


 


1/17/18 20:00 99.3 112 16 83/55 (64) 98   


 


1/17/18 19:30     97 Bi-Pap  50


 


1/17/18 19:30  112      


 


1/17/18 18:00  115  91/64 (73)    


 


1/17/18 16:23  118  92/62    


 


1/17/18 15:50     96   100


 


1/17/18 15:00 98.9 132 18 96/62 (73) 94   


 


1/17/18 15:00     94 Non-Rebreather  100


 


1/17/18 15:00  132      


 


1/17/18 14:58  137  81/56    


 


1/17/18 14:10     94 Non-Rebreather 15.00 


 


1/17/18 13:48  125  81/57    


 


1/17/18 13:20     80 Venturi Mask 6.00 50


 


1/17/18 11:00 97.7 126 15 93/60 (71) 94   


 


1/17/18 11:00  126      


 


1/17/18 11:00     94 Nasal Cannula 5.00 














I/O      


 


 1/17/18 1/17/18 1/17/18 1/18/18 1/18/18 1/18/18





 07:00 15:00 23:00 07:00 15:00 23:00


 


Intake Total 290 ml 100 ml 300 ml 431 ml  


 


Output Total 123 ml  2300 ml 370 ml  


 


Balance 167 ml 100 ml -2000 ml 61 ml  


 


      


 


Intake Oral 0 ml  0 ml 0 ml  


 


IV Total 290 ml 100 ml 300 ml 431 ml  


 


Output Urine Total 123 ml  800 ml 370 ml  


 


Drainage Total   1500 ml   


 


# Bowel Movements 1  1 1  








Physical Exam


GENERAL: Well-developed well-nourished. 


NECK: No carotid bruits.  No JVD.  


CARDIOVASCULAR: Regular rate and irregular rhythm.  No murmur appreciated.


RESPIRATORY: Breathing labored on BiPAP.  Coarse basilar breath sounds. 


MUSCULOSKELETAL: No clubbing or cyanosis.  No edema. 


NEUROLOGICAL: Lethargic, sedated on Ativan


Laboratory





Laboratory Tests








Test


  1/17/18


13:25 1/17/18


13:52 1/17/18


14:14 1/17/18


22:36


 


Pleural Fluid pH 8.5    


 


Pleural Fluid WBC 50 /MM3    


 


Pleural Fluid RBC 60 /MM3    


 


Pleural Fluid Neutrophils 93 %    


 


Pleural Fluid Lymphocytes 7 %    


 


Pleural Fluid Total Protein 3.4 GM/DL    


 


Pleural Fluid  U/L    


 


Pleural Fluid Glucose 91 MG/DL    


 


Blood Gas Puncture Site  RT RADIAL   


 


Blood Gas Patient Temperature  98.6   


 


Blood Gas HCO3  25 mmol/L   


 


Blood Gas Base Excess  0.9 mmol/L   


 


Blood Gas Oxygen Saturation  80 %   


 


Arterial Blood pH  7.40   


 


Arterial Blood Partial


Pressure CO2 


  42 mmHg 


  


  


 


 


Arterial Blood Partial


Pressure O2 


  49 mmHg 


  


  


 


 


Arterial Blood Oxygen Content  12.2 Vol %   


 


Arterial Blood


Carboxyhemoglobin 


  1.2 % 


  


  


 


 


Arterial Blood Methemoglobin  1.2 %   


 


Blood Gas Hemoglobin  10.9 G/DL   


 


Oxygen Delivery Device  Venti Mask   


 


Blood Gas Liter Flow  6 L/M   


 


Blood Gas Inspired Oxygen  50 %   


 


Blood Urea Nitrogen   34 MG/DL  35 MG/DL 


 


Creatinine   1.61 MG/DL  1.58 MG/DL 


 


Random Glucose   81 MG/DL  100 MG/DL 


 


Calcium Level   8.7 MG/DL  8.8 MG/DL 


 


Sodium Level   141 MEQ/L  141 MEQ/L 


 


Potassium Level   3.8 MEQ/L  3.2 MEQ/L 


 


Chloride Level   100 MEQ/L  98 MEQ/L 


 


Carbon Dioxide Level   28.6 MEQ/L  24.5 MEQ/L 


 


Anion Gap   12 MEQ/L  19 MEQ/L 


 


Estimat Glomerular Filtration


Rate 


  


  32 ML/MIN 


  32 ML/MIN 


 


 


Test


  1/18/18


06:15 1/18/18


08:15 


  


 


 


Prothrombin Time 20.8 SEC    


 


Prothromb Time International


Ratio 2.1 RATIO 


  


  


  


 


 


Activated Partial


Thromboplast Time 41.1 SEC 


  


  


  


 








Imaging





Last 24 hours Impressions








Chest X-Ray 1/18/18 0600 Signed





Impressions: 





 Service Date/Time:  Thursday, January 18, 2018 03:34 - CONCLUSION:  Worsening 





 consolidation at the right mid and lower lung with a at least mild right 

pleural 





 effusion. No pneumothorax is seen.     Demian Ludwig MD 











Assessment and Plan


Problem List:  


(1) CHF exacerbation


ICD Codes:  I50.9 - Heart failure, unspecified


Status:  Acute


Assessment and Plan


decompensated acute on chronic systolic and diastolic CHF, EF 25%


NYHA class IV


Infusions: milrinone, Bumex, vasopressin, dopamine


Good urine output overnight.  Edema improved.  Creatinine today pending





Problem Qualifiers





(1) CHF exacerbation:  


Qualified Codes:  I50.9 - Heart failure, unspecified








Aleks Johnson Jan 18, 2018 08:53

## 2018-01-18 NOTE — HHI.HCPN
Reason for visit


   a.  To assist with evaluation and management of symptoms including:  Dyspnea


   b.  To assist medical decision maker(s) with: better understanding of 

current medical conditions; weighing benefits/burdens of medical treatment 

options; making        


        medical treatment decisions.


.





Subjective/Interval History


INTERVAL NOTE:


The patient has continued to decline significantly.  She appears weaker, 

remains lethargic, and has evidence of worsening renal and hepatic function.





Her creatinine is now 2.2, and the bilirubin has risen to 2.3.





She is clearly decompensating and is terminal.








.


Family/friend interactions


I met with all 3 sons and 3 daughters-in-law, and all are in agreement that 

they wish to transition to comfort at this time to allow natural death.  They 

want to de-escalate care, removing the pressors and BiPAP.


.





Advance Directives


Living Will:  Copy in medical record


Health Care Surrogate:  Copy in medical record


Durable Power of :  Never completed


Advance Directive Specifics


Health Care Surrogate(s):


Primary healthcare surrogate his son Angel, secondary is son Kenyon


.


Documented care wishes:


The patient has a living will


.


Significant change in goals:


Transition to comfort to allow natural death


.





Objective





Vital Signs








  Date Time  Temp Pulse Resp B/P (MAP) Pulse Ox O2 Delivery O2 Flow Rate FiO2


 


1/18/18 10:15     0   40


 


1/18/18 07:24     93   40


 


1/18/18 07:00 95.0 115 25 101/46 (64) 95   


 


1/18/18 07:00  125      


 


1/18/18 07:00     92 Bi-Pap  40


 


1/18/18 06:31  113  77/59    


 


1/18/18 06:07  114  84/56    


 


1/18/18 06:00  119  84/53 (63)    


 


1/18/18 05:00  119  84/53 (63)    


 


1/18/18 04:00     97   40


 


1/18/18 04:00 96.0 129 26 85/54 (64) 94   


 


1/18/18 03:33  131      


 


1/18/18 03:32     97 Bi-Pap  40


 


1/18/18 02:53  129  110/55    


 


1/18/18 00:30  131      


 


1/18/18 00:24     100   40


 


1/18/18 00:00     97 Bi-Pap  40


 


1/18/18 00:00 98.6 121 32 100/72 (81) 100   


 


1/17/18 20:38     99   50


 


1/17/18 20:00 99.3 112 16 83/55 (64) 98   


 


1/17/18 19:30     97 Bi-Pap  50


 


1/17/18 19:30  112      


 


1/17/18 18:00  115  91/64 (73)    


 


1/17/18 16:23  118  92/62    


 


1/17/18 15:50     96   100


 


1/17/18 15:00 98.9 132 18 96/62 (73) 94   


 


1/17/18 15:00     94 Non-Rebreather  100


 


1/17/18 15:00  132      


 


1/17/18 14:58  137  81/56    


 


1/17/18 14:10     94 Non-Rebreather 15.00 


 


1/17/18 13:48  125  81/57    


 


1/17/18 13:20     80 Venturi Mask 6.00 50














Intake & Output  


 


 1/18/18 1/18/18





 07:00 19:00


 


Intake Total 431 ml 


 


Output Total 370 ml 


 


Balance 61 ml 


 


  


 


Intake Oral 0 ml 


 


IV Total 431 ml 


 


Output Urine Total 370 ml 


 


# Bowel Movements 1 








Physical Exam


CONSTITUTIONAL/GENERAL: She quite lethargic and restless now.


TUBES/LINES/DRAINS: Nasal O2, IV access, Mckeon


SKIN: No jaundice, rashes, or lesions. Ecchymoses on upper extremities. No 

wounds seen anteriorly. Skin temperature appropriate. Not diaphoretic. 


ENT: Nose without bleeding or purulent drainage.


NECK: Trachea midline. Supple, nontender. No palpable thyroid enlargement or 

nodularity. 


CARDIOVASCULAR: Regular rate and rhythm with grade 1 systolic murmur. 


RESPIRATORY/CHEST: Symmetric, unlabored respirations. Breath sounds equal 

bilaterally.  Some scattered rales


GASTROINTESTINAL: Abdomen soft, non-tender, nondistended. No hepato-splenomegaly

, or palpable masses. No guarding. Bowel sounds present.


GENITOURINARY: Without palpable bladder distension.  Mckeon catheter is present


NEUROLOGICAL: Lethargic, restless, pulling at things with her hands


PSYCHIATRIC: Restlessness and anxiety


.


.





Diagnostic Tests


Laboratory





Laboratory Tests








Test


  1/15/18


14:15 1/16/18


04:35 1/17/18


03:50 1/17/18


13:25


 


Nasal Screen MRSA (PCR)


  NEGATIVE


(NEGATIVE) 


  


  


 


 


Staphylococcus aureus


(PCR)(LAB) NEGATIVE


(NEGATIVE) 


  


  


 


 


White Blood Count


  


  6.9 TH/MM3


(4.0-11.0) 5.4 TH/MM3


(4.0-11.0) 


 


 


Red Blood Count


  


  3.28 MIL/MM3


(4.00-5.30) 2.91 MIL/MM3


(4.00-5.30) 


 


 


Hemoglobin


  


  9.9 GM/DL


(11.6-15.3) 9.1 GM/DL


(11.6-15.3) 


 


 


Hematocrit


  


  30.2 %


(35.0-46.0) 26.9 %


(35.0-46.0) 


 


 


Mean Corpuscular Volume


  


  92.1 FL


(80.0-100.0) 92.3 FL


(80.0-100.0) 


 


 


Mean Corpuscular Hemoglobin


  


  30.3 PG


(27.0-34.0) 31.2 PG


(27.0-34.0) 


 


 


Mean Corpuscular Hemoglobin


Concent 


  32.9 %


(32.0-36.0) 33.8 %


(32.0-36.0) 


 


 


Red Cell Distribution Width


  


  17.8 %


(11.6-17.2) 16.9 %


(11.6-17.2) 


 


 


Platelet Count


  


  47 TH/MM3


(150-450) 108 TH/MM3


(150-450) 


 


 


Mean Platelet Volume


  


  10.4 FL


(7.0-11.0) 8.5 FL


(7.0-11.0) 


 


 


Prothrombin Time


  


  15.6 SEC


(9.8-11.6) 14.0 SEC


(9.8-11.6) 


 


 


Prothromb Time International


Ratio 


  1.5 RATIO 


  1.4 RATIO 


  


 


 


Activated Partial


Thromboplast Time 


  35.7 SEC


(24.3-30.1) 35.3 SEC


(24.3-30.1) 


 


 


Blood Urea Nitrogen


  


  34 MG/DL


(7-18) 34 MG/DL


(7-18) 


 


 


Creatinine


  


  1.68 MG/DL


(0.50-1.00) 1.71 MG/DL


(0.50-1.00) 


 


 


Random Glucose


  


  75 MG/DL


() 80 MG/DL


() 


 


 


Total Protein


  


  6.1 GM/DL


(6.4-8.2) 6.5 GM/DL


(6.4-8.2) 


 


 


Albumin


  


  3.8 GM/DL


(3.4-5.0) 4.1 GM/DL


(3.4-5.0) 


 


 


Calcium Level


  


  8.5 MG/DL


(8.5-10.1) 8.4 MG/DL


(8.5-10.1) 


 


 


Phosphorus Level


  


  2.4 MG/DL


(2.5-4.9) 


  


 


 


Alkaline Phosphatase


  


  72 U/L


() 68 U/L


() 


 


 


Aspartate Amino Transf


(AST/SGOT) 


  70 U/L (15-37) 


  43 U/L (15-37) 


  


 


 


Alanine Aminotransferase


(ALT/SGPT) 


  30 U/L (10-53) 


  24 U/L (10-53) 


  


 


 


Total Bilirubin


  


  1.2 MG/DL


(0.2-1.0) 1.1 MG/DL


(0.2-1.0) 


 


 


Sodium Level


  


  139 MEQ/L


(136-145) 140 MEQ/L


(136-145) 


 


 


Potassium Level


  


  3.3 MEQ/L


(3.5-5.1) 3.0 MEQ/L


(3.5-5.1) 


 


 


Chloride Level


  


  97 MEQ/L


() 98 MEQ/L


() 


 


 


Carbon Dioxide Level


  


  29.6 MEQ/L


(21.0-32.0) 31.1 MEQ/L


(21.0-32.0) 


 


 


Anion Gap


  


  12 MEQ/L


(5-15) 11 MEQ/L


(5-15) 


 


 


Estimat Glomerular Filtration


Rate 


  30 ML/MIN


(>89) 29 ML/MIN


(>89) 


 


 


Lactic Acid Level


  


  3.5 mmol/L


(0.4-2.0) 2.4 mmol/L


(0.4-2.0) 


 


 


B-Type Natriuretic Peptide


  


  GREATER THAN


5000 PG/ML 


  


 


 


Pleural Fluid pH    8.5 


 


Pleural Fluid WBC    50 /MM3 (0-10) 


 


Pleural Fluid RBC    60 /MM3 (0-0) 


 


Pleural Fluid Neutrophils    93 % 


 


Pleural Fluid Lymphocytes    7 % 


 


Pleural Fluid Total Protein    3.4 GM/DL 


 


Pleural Fluid LDH    151 U/L 


 


Pleural Fluid Glucose    91 MG/DL 


 


Test


  1/17/18


13:52 1/17/18


14:14 1/17/18


22:36 1/18/18


06:15


 


Blood Gas Puncture Site RT RADIAL    


 


Blood Gas Patient Temperature 98.6    


 


Blood Gas HCO3


  25 mmol/L


(22-26) 


  


  


 


 


Blood Gas Base Excess


  0.9 mmol/L


(-2-2) 


  


  


 


 


Blood Gas Oxygen Saturation 80 % ()    


 


Arterial Blood pH


  7.40


(7.380-7.420) 


  


  


 


 


Arterial Blood Partial


Pressure CO2 42 mmHg


(38-42) 


  


  


 


 


Arterial Blood Partial


Pressure O2 49 mmHg


() 


  


  


 


 


Arterial Blood Oxygen Content


  12.2 Vol %


(12.0-20.0) 


  


  


 


 


Arterial Blood


Carboxyhemoglobin 1.2 % (0-4) 


  


  


  


 


 


Arterial Blood Methemoglobin 1.2 % (0-2)    


 


Blood Gas Hemoglobin


  10.9 G/DL


(12.0-16.0) 


  


  


 


 


Oxygen Delivery Device Venti Mask    


 


Blood Gas Liter Flow 6 L/M    


 


Blood Gas Inspired Oxygen 50 %    


 


Blood Urea Nitrogen


  


  34 MG/DL


(7-18) 35 MG/DL


(7-18) 


 


 


Creatinine


  


  1.61 MG/DL


(0.50-1.00) 1.58 MG/DL


(0.50-1.00) 


 


 


Random Glucose


  


  81 MG/DL


() 100 MG/DL


() 


 


 


Calcium Level


  


  8.7 MG/DL


(8.5-10.1) 8.8 MG/DL


(8.5-10.1) 


 


 


Sodium Level


  


  141 MEQ/L


(136-145) 141 MEQ/L


(136-145) 


 


 


Potassium Level


  


  3.8 MEQ/L


(3.5-5.1) 3.2 MEQ/L


(3.5-5.1) 


 


 


Chloride Level


  


  100 MEQ/L


() 98 MEQ/L


() 


 


 


Carbon Dioxide Level


  


  28.6 MEQ/L


(21.0-32.0) 24.5 MEQ/L


(21.0-32.0) 


 


 


Anion Gap


  


  12 MEQ/L


(5-15) 19 MEQ/L


(5-15) 


 


 


Estimat Glomerular Filtration


Rate 


  32 ML/MIN


(>89) 32 ML/MIN


(>89) 


 


 


Prothrombin Time


  


  


  


  20.8 SEC


(9.8-11.6)


 


Prothromb Time International


Ratio 


  


  


  2.1 RATIO 


 


 


Activated Partial


Thromboplast Time 


  


  


  41.1 SEC


(24.3-30.1)


 


Test


  1/18/18


08:15 


  


  


 


 


White Blood Count


  10.4 TH/MM3


(4.0-11.0) 


  


  


 


 


Red Blood Count


  3.55 MIL/MM3


(4.00-5.30) 


  


  


 


 


Hemoglobin


  11.0 GM/DL


(11.6-15.3) 


  


  


 


 


Hematocrit


  36.1 %


(35.0-46.0) 


  


  


 


 


Mean Corpuscular Volume


  101.7 FL


(80.0-100.0) 


  


  


 


 


Mean Corpuscular Hemoglobin


  31.0 PG


(27.0-34.0) 


  


  


 


 


Mean Corpuscular Hemoglobin


Concent 30.5 %


(32.0-36.0) 


  


  


 


 


Red Cell Distribution Width


  18.8 %


(11.6-17.2) 


  


  


 


 


Platelet Count


  109 TH/MM3


(150-450) 


  


  


 


 


Mean Platelet Volume


  9.2 FL


(7.0-11.0) 


  


  


 


 


Neutrophils (%) (Auto)


  88.0 %


(16.0-70.0) 


  


  


 


 


Lymphocytes (%) (Auto)


  5.9 %


(9.0-44.0) 


  


  


 


 


Monocytes (%) (Auto)


  5.8 %


(0.0-8.0) 


  


  


 


 


Eosinophils (%) (Auto)


  0.1 %


(0.0-4.0) 


  


  


 


 


Basophils (%) (Auto)


  0.2 %


(0.0-2.0) 


  


  


 


 


Neutrophils # (Auto)


  9.1 TH/MM3


(1.8-7.7) 


  


  


 


 


Lymphocytes # (Auto)


  0.6 TH/MM3


(1.0-4.8) 


  


  


 


 


Monocytes # (Auto)


  0.6 TH/MM3


(0-0.9) 


  


  


 


 


Eosinophils # (Auto)


  0.0 TH/MM3


(0-0.4) 


  


  


 


 


Basophils # (Auto)


  0.0 TH/MM3


(0-0.2) 


  


  


 


 


CBC Comment AUTO DIFF    


 


Differential Total Cells


Counted 100 


  


  


  


 


 


Neutrophils % (Manual) 87 % (16-70)    


 


Band Neutrophils % 3 % (0-6)    


 


Lymphocytes % 6 % (9-44)    


 


Monocytes % 4 % (0-8)    


 


Neutrophils # (Manual)


  9.4 TH/MM3


(1.8-7.7) 


  


  


 


 


Nucleated Red Blood Cells


  24 /100 WBC


(0-0) 


  


  


 


 


Differential Comment


  FINAL DIFF


MANUAL 


  


  


 


 


Platelet Estimate LOW (NORMAL)    


 


Platelet Morphology Comment


  ENLARGED


(NORMAL) 


  


  


 


 


Polychromasia


  2.6 %


(0.0-1.9) 


  


  


 


 


Red Cell Morphology Comment  (NORMAL)    


 


Blood Urea Nitrogen


  38 MG/DL


(7-18) 


  


  


 


 


Creatinine


  2.29 MG/DL


(0.50-1.00) 


  


  


 


 


Random Glucose


  11 MG/DL


() 


  


  


 


 


Total Protein


  7.4 GM/DL


(6.4-8.2) 


  


  


 


 


Albumin


  5.1 GM/DL


(3.4-5.0) 


  


  


 


 


Calcium Level


  9.1 MG/DL


(8.5-10.1) 


  


  


 


 


Alkaline Phosphatase


  66 U/L


() 


  


  


 


 


Aspartate Amino Transf


(AST/SGOT) 47 U/L (15-37) 


  


  


  


 


 


Alanine Aminotransferase


(ALT/SGPT) 20 U/L (10-53) 


  


  


  


 


 


Total Bilirubin


  2.3 MG/DL


(0.2-1.0) 


  


  


 


 


Sodium Level


  141 MEQ/L


(136-145) 


  


  


 


 


Potassium Level


  4.8 MEQ/L


(3.5-5.1) 


  


  


 


 


Chloride Level


  101 MEQ/L


() 


  


  


 


 


Carbon Dioxide Level


  7.5 MEQ/L


(21.0-32.0) 


  


  


 


 


Anion Gap


  33 MEQ/L


(5-15) 


  


  


 


 


Estimat Glomerular Filtration


Rate 21 ML/MIN


(>89) 


  


  


 








Result Diagram:  


1/18/18 0815                                                                   

             1/18/18 0815





Microbiology





Microbiology








 Date/Time


Source Procedure


Growth Status


 


 


 1/17/18 16:04


Blood Peripheral Aerobic Blood Culture - Preliminary


NO GROWTH IN 1 DAY Resulted


 


 1/17/18 16:04


Blood Peripheral Anaerobic Blood Culture - Preliminary


NO GROWTH IN 1 DAY Resulted





 1/17/18 16:00


Blood Peripheral Aerobic Blood Culture - Preliminary


NO GROWTH IN 1 DAY Resulted


 


 1/17/18 16:00


Blood Peripheral Anaerobic Blood Culture - Preliminary


NO GROWTH IN 1 DAY Resulted





 1/17/18 13:25


Fluid Pleural Fluid Fungal Smear - Final


NO FUNGAL ELEMENTS SEEN. Resulted


 


 1/17/18 13:25


Fluid Pleural Fluid Fungal Culture


Pending Resulted





 1/17/18 13:25


Fluid Pleural Fluid Acid Fast Stain


Pending Received


 


 1/17/18 13:25


Fluid Pleural Fluid Mycobacterial Culture


Pending Received





 1/17/18 13:25


Fluid Pleural Fluid Gram Stain - Final Resulted


 


 1/17/18 13:25


Fluid Pleural Fluid Body Fluid Culture


Pending Resulted








Imaging





Last Impressions








Chest X-Ray 1/18/18 0600 Signed





Impressions: 





 Service Date/Time:  Thursday, January 18, 2018 03:34 - CONCLUSION:  Worsening 





 consolidation at the right mid and lower lung with a at least mild right 

pleural 





 effusion. No pneumothorax is seen.     Demian Ludwig MD 


 


Abdomen/Pelvis CT 1/9/18 0000 Signed





Impressions: 





 Service Date/Time:  Tuesday, January 9, 2018 21:30 - CONCLUSION:  1. No 





 obstruction or acute inflammatory changes are seen in the abdomen or pelvis. 

2. 





 Apparent complex cystic and solid mass of the mid zone of the right kidney. A 





 followup MRI of the abdomen is recommended with and without contrast in 





 approximately 3 months. This may be helpful in further characterizing a small, 





 probably benign but nonspecific hypodensity of the liver. 3. Nonspecific right 





 greater than left pleural effusions and basilar atelectasis. There is an area 

of 





 nonenhancing right lower lobe pulmonary parenchyma compatible with pneumonia. 





 Infection and post obstructive process would be in the differential. After 





 treatment for presumed pneumonia, a followup CT of the chest, preferably with 





 intravenous contrast, is recommended within the next couple of months, sooner 

if 





 felt clinically indicated.  4. Also a nonspecific moderate size pericardial 





 effusion.     Demian Alegria MD 











Assessment and Plan


Disease Oriented Problem List:  


(1) cardiomyopathy


(2) congestive failure with pronounced right side failure symptoms


(3) low flow state, with some hepatic, renal, and GI effects


(4) CHF, first diagnosed October 2017


(5) right kidney cyst on CT scan, possibly complex


(6) colitis, possible ischemic


(7) GERD


(8) history of hypertension


(9) history of hyperthyroid


(10) peripheral neuropathy


(11) history of B12 deficiency


(12) history of avascular necrosis left hip 2003


Symptom Scale:  


(1) dyspnea


0-10 Scale:  1





(2) weakness


0-10 Scale:  6





Pertinent Non-Medical Issues


Psychosocial:  , 3 sons living locally, retired principal of [employer]


Spiritual:  Spirituality and her kimberly has been very important to her.  She has 

a [employer] background, but has been [employer] for many years, currently being 

supported by her .


Legal:  The patient has capacity for decision-making.  She has designated her 

sons Angel and Kenyon as primary and secondary HCS respectively.


Ethical issues impacting care: None


.


Important Contacts


Son: Angel Garcia [phone]


Son: Kenyon Garcia [phone]


.


Prognosis


She is terminal now.


.


Code Status:  No Code


Plan


* DO NOT RESUSCITATE


* DECISION-MAKING:  The patient now does not have capacity for decision-making.

  She has designated her sons Angel and Kenyon as primary and secondary HCS 

respectively, but all of the family is participating and in agreement.


* GOALS: All of the family is requesting a transition to comfort care, de-

escalating care, specifically requesting removal of the pressors another IV 

infusions, and removal of the BiPAP after comfort medications are provided.


* SYMPTOMS: I have written orders for morphine and Ativan to address the 

restlessness, anxiety, and dyspnea.


* The IV infusions and the BiPAP will be removed, the focus will now be on 

comfort to allow natural and peaceful death..


* Hospice has been consulted.


* Palliative Care will continue to follow the patient during this 

hospitalization.


.





Time Spent


Total Floor Time (mins):  44


Face to Face Time (mins):  13


>50% Counseling/Coord of Care:  Yes (d/w Dr. Arciniega and with RN)





Attestation


To help prompt me to consider important information that might be impacting 

today's encounter and assessment, information from prior notes written by 

myself or my colleagues may have been "brought forward" into today's note.  My 

signature on this note, however, is an attestation that I personally performed 

the exam, history, and/or decision-making noted today, and, unless otherwise 

indicated, the interactions with patient, family, and staff as well as the 

review of records all occurred today.  I also attest that the listed assessment 

and stated plan reflect my best clinical judgment today based on the 

combination of historical information, prior notes, and today's exam/ 

interactions.  When time spent is documented, it refers only to time spent 

today by the signer, or if indicated, combined time spent today by 

collaborating physician/nurse practitioner.











Nina Garcia MD Jan 18, 2018 12:39

## 2018-01-18 NOTE — HHI.CCPN
Subjective


Remarks/Hospital Course


Hospital Course:





72-year-old female with a medical history significant for CHF, cardiomyopathy 

who was admitted initially at [facility] from December 25 21


And subsequently discharged.  She has been very weak since her discharge and 

has had diarrhea for several months.  She has also had nausea and poor appetite 

as well as long-standing shortness of breath.  She presented back on January 9, 2018 to [facility] ER with generalized weakness as well as some shortness 

of breath.  She had significant edema in her lower extremities.  She was 

reportedly admitted with a sepsis at [facility] in December 2017 and CHF.

  Her LVEF is noted to be 20%.  During previous hospitalization she was treated 

with IV antibiotics and pressors.  She was also treated with antibiotics at 

that time.


Patient was admitted by [facility] on January 9 and was 

evaluated by cardiology.  She was initiated on diuretics, IV albumin as well as 

milrinone gtt.  Initially she received Rocephin and Flagyl which was 

subsequently stopped.


Milrinone gtt. was titrated off at 12 noon on 1/13.  Patient developed 

worsening shortness of breath and some altered mental status and abdominal pain 

subsequently.  She remained on 2 L nasal cannula however was slightly 

tachypneic.  ABG was done for further evaluation around 6 PM which revealed 

severe metabolic acidosis with pH 7.16, PCO2 18, PO2 98 and bicarbonate 6. 

Critical care was consulted on 1/13 around 6:30 PM.  Dr. Mccall was contacted by 

ICU RN N ordered 5 amps of sodium bicarbonate IV push.  Patient's milrinone was 

resumed at 0.5 mics per KG per minute.  I evaluated the patient after being 

notified of the consult and ordered stat labs and chest x-ray.  When I 

evaluated the patient she was encephalopathic/lethargic with tachypnea.  She 

had just received 5 A of sodium bicarbonate IV push.  Stat labs are pending.





Subjective:





1/14: remains in distress. lactate still elevated, and although clearing, very 

slowly. delayed clearance suggestive of higher mortality rate. poor urine 

output despite increasing doses of aggressive diuresis. had long discussion 

with patient, and she states she knows her heart is failing. had discussion 

with Dr. Mccall, and we both agree patient will need left and right heart caths 

in the AM and get objective evidence of PVR, PCWP and cardiac filling and 

output. she may need dialysis in order to offload intravascular volume from 

right heart failure.





1/15: uop improved from overnight, now 50-60cc/hr. remains on lasix drip. NPO 

for possible LHC/RHC. Cr remains elevated, but stable: likely YOLIE from [facility] from 

poor perfusion 48h ago. patient subjectively feels improved and complaining of 

less fatigue and sob.





1/16: Remains critically ill, MAP 60-61. Creat 1.68. Getting fluids per Dr. Niño in attempt to improve creat for Cath. Will place central line of 

pressor/inotrope infusion and also CVP monitoring.  ml in 24 hours





1/17: PAC placed yesterday, PA pressures 38/29 now. Last PCWP was 36. IVF 

discontinue, IV Lasix 60 mg ordered. Discussed  with Dr. Niño- agrees with 

management plan, he wants to use Dopamine at low dose. I will also start on 

Bumex gtt, with IV Albumin. Plan for bedside thoracentesis today





1/18: Critically ill and deteriorating, Initially good output on Bumex, now 

anuric, hypoglycemic (blood glucose 11). BiPAP dependent. Appears terminally 

ill.  Extended family at the bedside. Per Patient's previous wishes they want 

to transition to comfort measures. Discussed with Dr. Garcia and also 

discussed with Dr. Niño. All in agreement to transition to comfort measures.





Objective





Vital Signs








  Date Time  Temp Pulse Resp B/P (MAP) Pulse Ox O2 Delivery O2 Flow Rate FiO2


 


1/18/18 10:15     0   40


 


1/18/18 07:00 95.0 115 25 101/46 (64)    


 


1/18/18 07:00      Bi-Pap  


 


1/17/18 14:10       15.00 














Intake and Output   


 


 1/18/18 1/18/18 1/19/18





 08:00 16:00 00:00


 


Intake Total 431 ml  


 


Output Total 370 ml  


 


Balance 61 ml  








Result Diagram:  


1/18/18 0815                                                                   

             1/18/18 0815





Other Results





Laboratory Tests








Test


  1/17/18


13:52


 


Blood Gas Puncture Site RT RADIAL 


 


Blood Gas Patient Temperature 98.6 


 


Blood Gas HCO3


  25 mmol/L


(22-26)


 


Blood Gas Base Excess


  0.9 mmol/L


(-2-2)


 


Blood Gas Oxygen Saturation 80 % () 


 


Arterial Blood pH


  7.40


(7.380-7.420)


 


Arterial Blood Partial


Pressure CO2 42 mmHg


(38-42)


 


Arterial Blood Partial


Pressure O2 49 mmHg


()


 


Arterial Blood Oxygen Content


  12.2 Vol %


(12.0-20.0)


 


Arterial Blood


Carboxyhemoglobin 1.2 % (0-4) 


 


 


Arterial Blood Methemoglobin 1.2 % (0-2) 


 


Blood Gas Hemoglobin


  10.9 G/DL


(12.0-16.0)


 


Oxygen Delivery Device Venti Mask 


 


Blood Gas Liter Flow 6 L/M 


 


Blood Gas Inspired Oxygen 50 % 








Objective Remarks


HEENT/Neuro: No pallor or icterus, tongue dry, BiPAP mask in place Lethargic. 

Critically ill


Neck: RIJ introducer in place with PAC


Chest/pulmonary: Labored breathing on BiPAP. Diminished breath sounds at the 

bases.  s/p thoracentesis with 1.6L removed 1/17/18


Cardiovascular: Tachycardic with heart rate 110s, sinus by tele. No murmurs. 

Remains on milrinone, vasopressin and dopamine. PAC 35/28


GI/abdomen: Soft, mildly tender to palpation diffusely. no guarding


Extremities: Warm bilaterally, bilateral edema trace now


Urinary Catheter:  Yes


Assessment to:  Continue


Vascular Central Line Catheter:  Yes


Assessment to:  Continue





A/P


Assessment and Plan


Assessment: 72yF with biventricular failure, severe was in cardiogenic shock 

when milrinone was weaned. now with persistently elevated lactate, now 

improving. and remains on milrinone therapy. still clinically appears volume 

overloaded, PAC placed 1/16, consistent with BiV failure. DCd IVF, start Bumex 

drip





Active problems:


Severe biventricular failure


Cor pulmonale


Cardiogenic shock


Acute hypoxic respiratory failure - improving


Right lower lobe pneumonia and sepsis


Severe hypoglycemia


Bilateral pleural effusion R>L


Congestive hepatopathy


Acute kidney injury secondary to cardiogenic shock


Pulmonary hypertension


Nonischemic cardiomyopathy with LVEF 20%


Metabolic acidosis: Suspect low flow state causing lactic acidosis with 

probable bowel ischemia


Diarrhea - improving.


Abdominal pain


Tachycardia


Thrombocytopenia





Plan:





Discontinued PA catheter


Continue milrinone, vasopressin and dopamine


Worsening shock and respiratory failure patient is terminally ill


Abscess septic from right lower lobe pneumonia on cefepime now


Monitor BMP every 8 hours with potassium supplements


s/p right thoracentesis 1/17 with 1.6L fluid removed, transudative


watch uop, requires herrera


Continue BiPAP wean o2 for goal spo2 > 92%


Lomotil for diarrhea: likely this is from ischemia, but patient says it gives 

her symptomatic relief, and she specifically requests it.


HIT negative -Restart Lovenox if aggressive care desired





Remain in ICU. critically ill. off pathway.





CCT 32 MIN





Discussed extensively with Dr. Niño, Dr. Garcia


Critically ill and deteriorating, Appears terminal from end-stage biventricular 

failure now complicated with sepsis pneumonia acute renal failure 

encephalopathy.  Extended family at the bedside. Per Patient's previous wishes 

they want to transition to comfort measures. Dr. Garcia to place comfort 

measure orders











Carlos Arciniega MD Jan 18, 2018 11:40

## 2018-01-19 VITALS
RESPIRATION RATE: 12 BRPM | HEART RATE: 119 BPM | TEMPERATURE: 98.1 F | DIASTOLIC BLOOD PRESSURE: 35 MMHG | SYSTOLIC BLOOD PRESSURE: 56 MMHG

## 2018-01-19 RX ADMIN — MORPHINE SULFATE SCH MG: 2 INJECTION, SOLUTION INTRAMUSCULAR; INTRAVENOUS at 03:03

## 2018-01-19 RX ADMIN — MORPHINE SULFATE SCH MG: 2 INJECTION, SOLUTION INTRAMUSCULAR; INTRAVENOUS at 00:00

## 2018-01-19 RX ADMIN — MORPHINE SULFATE SCH MG: 2 INJECTION, SOLUTION INTRAMUSCULAR; INTRAVENOUS at 10:03

## 2018-01-19 NOTE — HHI.CCPN
Subjective


Remarks/Hospital Course


Hospital Course:





72-year-old female with a medical history significant for CHF, cardiomyopathy 

who was admitted initially at [facility] from December 25 21


And subsequently discharged.  She has been very weak since her discharge and 

has had diarrhea for several months.  She has also had nausea and poor appetite 

as well as long-standing shortness of breath.  She presented back on January 9, 2018 to [facility] ER with generalized weakness as well as some shortness 

of breath.  She had significant edema in her lower extremities.  She was 

reportedly admitted with a sepsis at [facility] in December 2017 and CHF.

  Her LVEF is noted to be 20%.  During previous hospitalization she was treated 

with IV antibiotics and pressors.  She was also treated with antibiotics at 

that time.


Patient was admitted by [facility] on January 9 and was 

evaluated by cardiology.  She was initiated on diuretics, IV albumin as well as 

milrinone gtt.  Initially she received Rocephin and Flagyl which was 

subsequently stopped.


Milrinone gtt. was titrated off at 12 noon on 1/13.  Patient developed 

worsening shortness of breath and some altered mental status and abdominal pain 

subsequently.  She remained on 2 L nasal cannula however was slightly 

tachypneic.  ABG was done for further evaluation around 6 PM which revealed 

severe metabolic acidosis with pH 7.16, PCO2 18, PO2 98 and bicarbonate 6. 

Critical care was consulted on 1/13 around 6:30 PM.  Dr. Mccall was contacted by 

ICU RN N ordered 5 amps of sodium bicarbonate IV push.  Patient's milrinone was 

resumed at 0.5 mics per KG per minute.  I evaluated the patient after being 

notified of the consult and ordered stat labs and chest x-ray.  When I 

evaluated the patient she was encephalopathic/lethargic with tachypnea.  She 

had just received 5 A of sodium bicarbonate IV push.  Stat labs are pending.





Subjective:





1/14: remains in distress. lactate still elevated, and although clearing, very 

slowly. delayed clearance suggestive of higher mortality rate. poor urine 

output despite increasing doses of aggressive diuresis. had long discussion 

with patient, and she states she knows her heart is failing. had discussion 

with Dr. Mccall, and we both agree patient will need left and right heart caths 

in the AM and get objective evidence of PVR, PCWP and cardiac filling and 

output. she may need dialysis in order to offload intravascular volume from 

right heart failure.





1/15: uop improved from overnight, now 50-60cc/hr. remains on lasix drip. NPO 

for possible LHC/RHC. Cr remains elevated, but stable: likely YOLIE from [facility] from 

poor perfusion 48h ago. patient subjectively feels improved and complaining of 

less fatigue and sob.





1/16: Remains critically ill, MAP 60-61. Creat 1.68. Getting fluids per Dr. Niño in attempt to improve creat for Cath. Will place central line of 

pressor/inotrope infusion and also CVP monitoring.  ml in 24 hours





1/17: PAC placed yesterday, PA pressures 38/29 now. Last PCWP was 36. IVF 

discontinue, IV Lasix 60 mg ordered. Discussed  with Dr. Niño- agrees with 

management plan, he wants to use Dopamine at low dose. I will also start on 

Bumex gtt, with IV Albumin. Plan for bedside thoracentesis today





1/18: Critically ill and deteriorating, Initially good output on Bumex, now 

anuric, hypoglycemic (blood glucose 11). BiPAP dependent. Appears terminally 

ill.  Extended family at the bedside. Per Patient's previous wishes they want 

to transition to comfort measures. Discussed with Dr. Garcia and also 

discussed with Dr. Niño. All in agreement to transition to comfort measures.





1/19: BiPAP and vasopressors/inotropes discontinued yesterday.  Systolic blood 

pressure in 60s.  Patient is hypopneic. Will transfer to hospice with comfort 

measures





Objective





Vital Signs








  Date Time  Temp Pulse Resp B/P (MAP) Pulse Ox O2 Delivery O2 Flow Rate FiO2


 


1/19/18 08:27        21


 


1/19/18 03:00      Room Air  


 


1/18/18 20:00 96.5 121 10 67/39 (48)    


 


1/18/18 11:00     92   


 


1/17/18 14:10       15.00 














Intake and Output   


 


 1/19/18 1/19/18 1/20/18





 08:00 16:00 00:00


 


Intake Total 0 ml  


 


Output Total 0 ml  


 


Balance 0 ml  








Result Diagram:  


1/18/18 0815 1/18/18 0815





Objective Remarks


HEENT/Neuro: No pallor or icterus, tongue dry, on RA. SaO2 not recordable. 

Somnolent, unresponsive


Neck: RIJ introducer, PAC removed


Chest/pulmonary: Diminished breath sounds at the bases.  Hypopneic


Cardiovascular: Tachycardic. No murmurs. SBP 60-65


GI/abdomen: Soft, mildly tender to palpation diffusely. no guarding


Extremities: Warm bilaterally, bilateral edema trace now





A/P


Assessment and Plan


Assessment: 72yF with biventricular failure, severe was in cardiogenic shock 

when milrinone was weaned. now with persistently elevated lactate, now 

improving. and remains on milrinone therapy. still clinically appears volume 

overloaded, PAC placed 1/16, consistent with BiV failure. DCd IVF, start Bumex 

drip





Active problems:


Severe biventricular failure


Cor pulmonale


Cardiogenic shock


Acute hypoxic respiratory failure - improving


Right lower lobe pneumonia and sepsis


Severe hypoglycemia


Bilateral pleural effusion R>L


Congestive hepatopathy


Acute kidney injury secondary to cardiogenic shock


Pulmonary hypertension


Nonischemic cardiomyopathy with LVEF 20%


Metabolic acidosis: Suspect low flow state causing lactic acidosis with 

probable bowel ischemia


Diarrhea - improving.


Abdominal pain


Tachycardia


Thrombocytopenia





Plan:





Discontinued PA catheter. Discontinued milrinone, vasopressin and dopamine 1/19/ 18


DCd BiPAP 1/18


Anxiety and pain control with Ativan and morphine


Transfer to Hospice


Patient is terminally ill








Level 1











Calros Arciniega MD Jan 19, 2018 08:31

## 2018-01-19 NOTE — HHI.DS
Discharge Summary


Admission Date


Jan 9, 2018 at 21:45


Discharge Date:  Jan 19, 2018


Admitting Diagnosis





CHF exacerbation, lactic acidosis, hypotension





(1) CHF exacerbation


ICD Code:  I50.9 - Heart failure, unspecified


Status:  Acute


(2) Biventricular heart failure


ICD Code:  I50.82 - Biventricular heart failure


(3) Hypotension


ICD Code:  I95.9 - Hypotension, unspecified


Status:  Acute


(4) Acute hypoxemic respiratory failure


ICD Code:  J96.01 - Acute respiratory failure with hypoxia


(5) Acute renal failure


ICD Code:  N17.9 - Acute kidney failure, unspecified


(6) Cardiogenic shock


ICD Code:  R57.0 - Cardiogenic shock


(7) probable pneumonia


Diagnosis:  Principal





(8) Acute encephalopathy


ICD Code:  G93.40 - Encephalopathy, unspecified


(9) Lactic acidosis


ICD Code:  E87.2 - Acidosis


Status:  Acute


(10) Hypomagnesemia


ICD Code:  E83.42 - Hypomagnesemia


Status:  Acute


(11) Cardiomyopathy


ICD Code:  I42.9 - Cardiomyopathy, unspecified


Diagnosis:  Secondary





Procedures


Right IJ introducer and PA catheter placement 1/16/18


Right thoracentesis 1/17/18


Brief History


72-year-old female with a medical history significant for CHF, cardiomyopathy 

who was admitted initially at [facility] from December 25 21


And subsequently discharged.  She has been very weak since her discharge and 

has had diarrhea for several months.  She has also had nausea and poor appetite 

as well as long-standing shortness of breath.  She presented back on January 9, 2018 to [facility] ER with generalized weakness as well as some shortness 

of breath.  She had significant edema in her lower extremities.  She was 

reportedly admitted with a sepsis at [facility] in December 2017 and CHF.

  Her LVEF is noted to be 20%.  During previous hospitalization she was treated 

with IV antibiotics and pressors.  She was also treated with antibiotics at 

that time.


Patient was admitted by [facility] hospitalists on January 9 and was 

evaluated by cardiology.  She was initiated on diuretics, IV albumin as well as 

milrinone gtt.  Initially she received Rocephin and Flagyl which was 

subsequently stopped.


Milrinone gtt. was titrated off at 12 noon on 1/13.  Patient developed 

worsening shortness of breath and some altered mental status and abdominal pain 

subsequently.  She remained on 2 L nasal cannula however was slightly 

tachypneic.  ABG was done for further evaluation around 6 PM which revealed 

severe metabolic acidosis with pH 7.16, PCO2 18, PO2 98 and bicarbonate 6. 

Critical care was consulted on 1/13 around 6:30 PM.  Dr. Mccall was contacted by 

ICU RN N ordered 5 amps of sodium bicarbonate IV push.  Patient's milrinone was 

resumed at 0.5 mics per KG per minute.  I evaluated the patient after being 

notified of the consult and ordered stat labs and chest x-ray.  When I 

evaluated the patient she was encephalopathic/lethargic with tachypnea.  She 

had just received 5 A of sodium bicarbonate IV push.  Stat labs are pending.


CBC/BMP:  


1/18/18 0815                                                                   

             1/18/18 0815





Significant Findings





Laboratory Tests








Test


  1/17/18


03:50 1/17/18


13:25 1/17/18


13:52 1/17/18


14:14


 


Red Blood Count


  2.91 MIL/MM3


(4.00-5.30) 


  


  


 


 


Hemoglobin


  9.1 GM/DL


(11.6-15.3) 


  


  


 


 


Hematocrit


  26.9 %


(35.0-46.0) 


  


  


 


 


Platelet Count


  108 TH/MM3


(150-450) 


  


  


 


 


Prothrombin Time


  14.0 SEC


(9.8-11.6) 


  


  


 


 


Activated Partial


Thromboplast Time 35.3 SEC


(24.3-30.1) 


  


  


 


 


Blood Urea Nitrogen


  34 MG/DL


(7-18) 


  


  34 MG/DL


(7-18)


 


Creatinine


  1.71 MG/DL


(0.50-1.00) 


  


  1.61 MG/DL


(0.50-1.00)


 


Calcium Level


  8.4 MG/DL


(8.5-10.1) 


  


  


 


 


Aspartate Amino Transf


(AST/SGOT) 43 U/L (15-37) 


  


  


  


 


 


Total Bilirubin


  1.1 MG/DL


(0.2-1.0) 


  


  


 


 


Potassium Level


  3.0 MEQ/L


(3.5-5.1) 


  


  


 


 


Estimat Glomerular Filtration


Rate 29 ML/MIN


(>89) 


  


  32 ML/MIN


(>89)


 


Lactic Acid Level


  2.4 mmol/L


(0.4-2.0) 


  


  


 


 


Pleural Fluid WBC  50 /MM3 (0-10)   


 


Pleural Fluid RBC  60 /MM3 (0-0)   


 


Blood Gas Oxygen Saturation   80 % ()  


 


Arterial Blood Partial


Pressure O2 


  


  49 mmHg


() 


 


 


Blood Gas Hemoglobin


  


  


  10.9 G/DL


(12.0-16.0) 


 


 


Test


  1/17/18


22:36 1/18/18


06:15 1/18/18


08:15 


 


 


Blood Urea Nitrogen


  35 MG/DL


(7-18) 


  38 MG/DL


(7-18) 


 


 


Creatinine


  1.58 MG/DL


(0.50-1.00) 


  2.29 MG/DL


(0.50-1.00) 


 


 


Potassium Level


  3.2 MEQ/L


(3.5-5.1) 


  


  


 


 


Anion Gap


  19 MEQ/L


(5-15) 


  33 MEQ/L


(5-15) 


 


 


Estimat Glomerular Filtration


Rate 32 ML/MIN


(>89) 


  21 ML/MIN


(>89) 


 


 


Prothrombin Time


  


  20.8 SEC


(9.8-11.6) 


  


 


 


Activated Partial


Thromboplast Time 


  41.1 SEC


(24.3-30.1) 


  


 


 


Red Blood Count


  


  


  3.55 MIL/MM3


(4.00-5.30) 


 


 


Hemoglobin


  


  


  11.0 GM/DL


(11.6-15.3) 


 


 


Mean Corpuscular Volume


  


  


  101.7 FL


(80.0-100.0) 


 


 


Mean Corpuscular Hemoglobin


Concent 


  


  30.5 %


(32.0-36.0) 


 


 


Red Cell Distribution Width


  


  


  18.8 %


(11.6-17.2) 


 


 


Platelet Count


  


  


  109 TH/MM3


(150-450) 


 


 


Neutrophils (%) (Auto)


  


  


  88.0 %


(16.0-70.0) 


 


 


Lymphocytes (%) (Auto)


  


  


  5.9 %


(9.0-44.0) 


 


 


Neutrophils # (Auto)


  


  


  9.1 TH/MM3


(1.8-7.7) 


 


 


Lymphocytes # (Auto)


  


  


  0.6 TH/MM3


(1.0-4.8) 


 


 


Neutrophils % (Manual)   87 % (16-70)  


 


Lymphocytes %   6 % (9-44)  


 


Neutrophils # (Manual)


  


  


  9.4 TH/MM3


(1.8-7.7) 


 


 


Nucleated Red Blood Cells


  


  


  24 /100 WBC


(0-0) 


 


 


Platelet Estimate   LOW (NORMAL)  


 


Platelet Morphology Comment


  


  


  ENLARGED


(NORMAL) 


 


 


Polychromasia


  


  


  2.6 %


(0.0-1.9) 


 


 


Random Glucose


  


  


  11 MG/DL


() 


 


 


Albumin


  


  


  5.1 GM/DL


(3.4-5.0) 


 


 


Aspartate Amino Transf


(AST/SGOT) 


  


  47 U/L (15-37) 


  


 


 


Total Bilirubin


  


  


  2.3 MG/DL


(0.2-1.0) 


 


 


Carbon Dioxide Level


  


  


  7.5 MEQ/L


(21.0-32.0) 


 








Imaging


Chest X-Ray 1/18/18 0600 Signed


Impressions: 


 Service Date/Time:  Thursday, January 18, 2018 03:34 - CONCLUSION:  Worsening 


 consolidation at the right mid and lower lung with a at least mild right 

pleural 


 effusion. No pneumothorax is seen.     Demian Ludwig MD 





Abdomen/Pelvis CT 1/9/18 0000 Signed


Impressions: 


 Service Date/Time:  Tuesday, January 9, 2018 21:30 - CONCLUSION:  1. No 


 obstruction or acute inflammatory changes are seen in the abdomen or pelvis. 

2. 


 Apparent complex cystic and solid mass of the mid zone of the right kidney. A 


 followup MRI of the abdomen is recommended with and without contrast in 


 approximately 3 months. This may be helpful in further characterizing a small, 


 probably benign but nonspecific hypodensity of the liver. 3. Nonspecific right 


 greater than left pleural effusions and basilar atelectasis. There is an area 

of 


 nonenhancing right lower lobe pulmonary parenchyma compatible with pneumonia. 


 Infection and post obstructive process would be in the differential. After 


 treatment for presumed pneumonia, a followup CT of the chest, preferably with 


 intravenous contrast, is recommended within the next couple of months, sooner 

if 


 felt clinically indicated.  4. Also a nonspecific moderate size pericardial 


 effusion.     Demian Alegria MD


PE at Discharge


HEENT/Neuro: No pallor or icterus, tongue dry, on RA. SaO2 not recordable. 

Somnolent, unresponsive


Neck: RIJ introducer, PAC removed


Chest/pulmonary: Diminished breath sounds at the bases.  Hypopneic


Cardiovascular: Tachycardic. No murmurs. SBP 60-65


GI/abdomen: Soft, mildly tender to palpation diffusely. no guarding


Extremities: Warm bilaterally, bilateral edema trace now


Transfer Summary


see hospital course


Hospital Course


Hospital Course:





72-year-old female with a medical history significant for CHF, cardiomyopathy 

who was admitted initially at [facility] from December 25 21


And subsequently discharged.  She has been very weak since her discharge and 

has had diarrhea for several months.  She has also had nausea and poor appetite 

as well as long-standing shortness of breath.  She presented back on January 9, 2018 to [facility] ER with generalized weakness as well as some shortness 

of breath.  She had significant edema in her lower extremities.  She was 

reportedly admitted with a sepsis at [facility] in December 2017 and CHF.

  Her LVEF is noted to be 20%.  During previous hospitalization she was treated 

with IV antibiotics and pressors.  She was also treated with antibiotics at 

that time.


Patient was admitted by [facility] hospitalists on January 9 and was 

evaluated by cardiology.  She was initiated on diuretics, IV albumin as well as 

milrinone gtt.  Initially she received Rocephin and Flagyl which was 

subsequently stopped.


Milrinone gtt. was titrated off at 12 noon on 1/13.  Patient developed 

worsening shortness of breath and some altered mental status and abdominal pain 

subsequently.  She remained on 2 L nasal cannula however was slightly 

tachypneic.  ABG was done for further evaluation around 6 PM which revealed 

severe metabolic acidosis with pH 7.16, PCO2 18, PO2 98 and bicarbonate 6. 

Critical care was consulted on 1/13 around 6:30 PM.  Dr. Mccall was contacted by 

ICU RN N ordered 5 amps of sodium bicarbonate IV push.  Patient's milrinone was 

resumed at 0.5 mics per KG per minute.  I evaluated the patient after being 

notified of the consult and ordered stat labs and chest x-ray.  When I 

evaluated the patient she was encephalopathic/lethargic with tachypnea.  She 

had just received 5 A of sodium bicarbonate IV push.  Stat labs are pending.





Subjective:





1/14: remains in distress. lactate still elevated, and although clearing, very 

slowly. delayed clearance suggestive of higher mortality rate. poor urine 

output despite increasing doses of aggressive diuresis. had long discussion 

with patient, and she states she knows her heart is failing. had discussion 

with Dr. Mccall, and we both agree patient will need left and right heart caths 

in the AM and get objective evidence of PVR, PCWP and cardiac filling and 

output. she may need dialysis in order to offload intravascular volume from 

right heart failure.





1/15: uop improved from overnight, now 50-60cc/hr. remains on lasix drip. NPO 

for possible LHC/RHC. Cr remains elevated, but stable: likely YOLIE from ATN from 

poor perfusion 48h ago. patient subjectively feels improved and complaining of 

less fatigue and sob.





1/16: Remains critically ill, MAP 60-61. Creat 1.68. Getting fluids per Dr. Niño in attempt to improve creat for Cath. Will place central line of 

pressor/inotrope infusion and also CVP monitoring.  ml in 24 hours





1/17: PAC placed yesterday, PA pressures 38/29 now. Last PCWP was 36. IVF 

discontinue, IV Lasix 60 mg ordered. Discussed  with Dr. Niño- agrees with 

management plan, he wants to use Dopamine at low dose. I will also start on 

Bumex gtt, with IV Albumin. Plan for bedside thoracentesis today





1/18: Critically ill and deteriorating, Initially good output on Bumex, now 

anuric, hypoglycemic (blood glucose 11). BiPAP dependent. Appears terminally 

ill.  Extended family at the bedside. Per Patient's previous wishes they want 

to transition to comfort measures. Discussed with Dr. Garcia and also 

discussed with Dr. Niño. All in agreement to transition to comfort measures.





1/19: BiPAP and vasopressors/inotropes discontinued yesterday.  Systolic blood 

pressure in 60s.  Patient is hypopneic. Will transfer to hospice with comfort 

measures. Appears comfortable but terminally ill


Pt Condition on Discharge:  Deteriorating


Discharge Disposition:  Hospice/Med Facility


Discharge Instructions


DIET: Follow Instructions for:  Nothing By Mouth


Activities you can perform:  Continue Bedrest











Carlos Arciniega MD Jan 19, 2018 08:32

## 2021-01-03 NOTE — ECHRPT
Indication:   heart failure

 

 CONCLUSIONS

 The left ventricular systolic function is severely reduced with an estimated ejection fraction in th
e range of 

 25-30%. 

 Wall thickness is normal. 

 There is global left ventricular dysfunction. ef=25%

 A pacemaker wire is noted. 

 There is a pacemaker wire present in the right atrial cavity. 

 Moderate  mitral valve regurgitation. 

 Aortic valve sclerosis is present. 

 There is moderate tricuspid regurgitation. 

 The estimated pulmonary arterial pressure is 32.3 mmHg. 

 There is a small pericardial effusion present. 

 A small left sided pleural effusion is noted. 

 

 BP:  105   / 58      HR: 107                      Rhythm:           Sinus

 

 MEASUREMENTS  (Male / Female) Normal Values       Technical Quality:Fair

 2D ECHO

 LV Diastolic Diameter PLAX        4.4 cm                4.2 - 5.9 / 3.9 - 5.3 cm

 LV Systolic Diameter PLAX         3.6 cm                

 IVS Diastolic Thickness           1.0 cm                0.6 - 1.0 / 0.6 - 0.9 cm

 LVPW Diastolic Thickness          1.0 cm                0.6 - 1.0 / 0.6 - 0.9 cm

 LV Relative Wall Thickness        0.5                   

 RV Internal Dim ED PLAX           2.7 cm                

 LVOT Diameter                     1.6 cm                

 LA Systolic Diameter LX           3.3 cm                3.0 - 4.0 / 2.7 - 3.8 cm

 LV Ejection Fraction MOD 4C       45.2 %                

 LV Cardiac Index MOD 4C           1867.1 cm/minm     

 LV Ejection Fraction 4C AL        46.4 %                

 LV Cardiac Index 4C AL            1994.5 cm/minm     

 

 M-MODE

 Aortic Root Diameter MM           2.1 cm                

 LA Systolic Diameter MM           3.3 cm                

 LA Ao Ratio MM                    1.6                   

 AV Cusp Separation MM             1.6 cm                

 

 DOPPLER

 AV Peak Velocity                  79.3 cm/s             

 AV Peak Gradient                  2.5 mmHg              

 LVOT Peak Velocity                72.1 cm/s             

 LVOT Peak Gradient                2.1 mmHg              

 AV Area Cont Eq pk                1.8 cm               

 MV Area PHT                       5.0 cm               

 Mitral E Point Velocity           73.1 cm/s             

 Mitral A Point Velocity           94.8 cm/s             

 Mitral E to A Ratio               0.8                   

 LV E' Lateral Velocity            6.6 cm/s              

 Mitral E to LV E' Lateral Ratio   11.0                  

 LV E' Septal Velocity             3.9 cm/s              

 Mitral E to LV E' Septal Ratio    18.7                  

 TR Peak Velocity                  236.0 cm/s            

 TR Peak Gradient                  22.3 mmHg             

 Right Atrial Pressure             10.0 mmHg             

 Pulmonary Artery Systolic Pressu  32.3 mmHg             

 Right Ventricular Systolic Press  32.3 mmHg             

 PV Peak Velocity                  67.3 cm/s             

 PV Peak Gradient                  1.8 mmHg              

 

 

 FINDINGS

 

 LEFT VENTRICLE

 The left ventricular systolic function is severely reduced with an estimated ejection fraction in th
e range of 

 25-30%. 

 Wall thickness is normal. 

 There is global left ventricular dysfunction. 

 

 RIGHT VENTRICLE

 A pacemaker wire is noted. 

 

 LEFT ATRIUM

 The left atrial size is normal.  

 

 RIGHT ATRIUM

 There is a pacemaker wire present in the right atrial cavity. 

 

 ATRIAL SEPTUM

 Normal atrial septal thickness without atrial level shunting by limited color doppler interrogation.
  

 

 AORTA

 The aortic root and proximal ascending aorta are normal in size on limited imaging.  

 

 MITRAL VALVE

 Structurally normal mitral valve. 

 Mild mitral valve regurgitation. 

 

 AORTIC VALVE

 Trileaflet aortic valve. 

 Aortic valve sclerosis is present. 

 

 

 TRICUSPID VALVE

 Structurally normal tricuspid valve. 

 There is moderate tricuspid regurgitation. 

 The estimated pulmonary arterial pressure is 32.3 mmHg. 

 

 PULMONARY VALVE

 No pulmonary valve regurgitation or stenosis.  

 

 VESSELS

 The inferior vena cava is normal in size.  

 

 PERICARDIUM

 There is a small pericardial effusion present. 

 A small left sided pleural effusion is noted. 

 

 

 

 

  Braden Padgett MD, FACC, [facility]

  (Electronically Signed)

  Final Date:17 January 2018 14:19 stated